# Patient Record
Sex: MALE | Race: BLACK OR AFRICAN AMERICAN | NOT HISPANIC OR LATINO | Employment: UNEMPLOYED | URBAN - METROPOLITAN AREA
[De-identification: names, ages, dates, MRNs, and addresses within clinical notes are randomized per-mention and may not be internally consistent; named-entity substitution may affect disease eponyms.]

---
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
## 2021-02-23 ENCOUNTER — IMMUNIZATION (OUTPATIENT)
Dept: PRIMARY CARE CLINIC | Facility: CLINIC | Age: 21
End: 2021-02-23
Payer: COMMERCIAL

## 2021-02-23 DIAGNOSIS — Z23 NEED FOR VACCINATION: Primary | ICD-10-CM

## 2021-02-23 PROCEDURE — 91300 COVID-19, MRNA, LNP-S, PF, 30 MCG/0.3 ML DOSE VACCINE: CPT | Mod: PBBFAC | Performed by: EMERGENCY MEDICINE

## 2021-03-16 ENCOUNTER — IMMUNIZATION (OUTPATIENT)
Dept: PRIMARY CARE CLINIC | Facility: CLINIC | Age: 21
End: 2021-03-16
Payer: COMMERCIAL

## 2021-03-16 DIAGNOSIS — Z23 NEED FOR VACCINATION: Primary | ICD-10-CM

## 2021-03-16 PROCEDURE — 0002A COVID-19, MRNA, LNP-S, PF, 30 MCG/0.3 ML DOSE VACCINE: CPT | Mod: PBBFAC | Performed by: EMERGENCY MEDICINE

## 2021-03-16 PROCEDURE — 91300 COVID-19, MRNA, LNP-S, PF, 30 MCG/0.3 ML DOSE VACCINE: CPT | Mod: PBBFAC | Performed by: EMERGENCY MEDICINE

## 2021-12-07 ENCOUNTER — OFFICE VISIT (OUTPATIENT)
Dept: FAMILY MEDICINE | Facility: CLINIC | Age: 21
End: 2021-12-07
Payer: COMMERCIAL

## 2021-12-07 VITALS
TEMPERATURE: 98 F | WEIGHT: 226.44 LBS | DIASTOLIC BLOOD PRESSURE: 88 MMHG | OXYGEN SATURATION: 97 % | BODY MASS INDEX: 36.39 KG/M2 | HEIGHT: 66 IN | SYSTOLIC BLOOD PRESSURE: 120 MMHG | HEART RATE: 100 BPM

## 2021-12-07 DIAGNOSIS — E89.0 POSTABLATIVE HYPOTHYROIDISM: ICD-10-CM

## 2021-12-07 DIAGNOSIS — E05.00 GRAVES DISEASE: ICD-10-CM

## 2021-12-07 DIAGNOSIS — B35.3 TINEA PEDIS OF BOTH FEET: ICD-10-CM

## 2021-12-07 DIAGNOSIS — I10 ESSENTIAL HYPERTENSION: ICD-10-CM

## 2021-12-07 DIAGNOSIS — Z23 NEED FOR INFLUENZA VACCINATION: ICD-10-CM

## 2021-12-07 DIAGNOSIS — Z00.00 WELLNESS EXAMINATION: ICD-10-CM

## 2021-12-07 DIAGNOSIS — Z00.00 ENCOUNTER FOR MEDICAL EXAMINATION TO ESTABLISH CARE: Primary | ICD-10-CM

## 2021-12-07 DIAGNOSIS — Z23 NEED FOR DIPHTHERIA-TETANUS-PERTUSSIS (TDAP) VACCINE: ICD-10-CM

## 2021-12-07 PROCEDURE — 90686 FLU VACCINE (QUAD) GREATER THAN OR EQUAL TO 3YO PRESERVATIVE FREE IM: ICD-10-PCS | Mod: S$GLB,,, | Performed by: FAMILY MEDICINE

## 2021-12-07 PROCEDURE — 99999 PR PBB SHADOW E&M-EST. PATIENT-LVL V: ICD-10-PCS | Mod: PBBFAC,,, | Performed by: FAMILY MEDICINE

## 2021-12-07 PROCEDURE — 99214 PR OFFICE/OUTPT VISIT, EST, LEVL IV, 30-39 MIN: ICD-10-PCS | Mod: 25,S$GLB,, | Performed by: FAMILY MEDICINE

## 2021-12-07 PROCEDURE — 90686 IIV4 VACC NO PRSV 0.5 ML IM: CPT | Mod: S$GLB,,, | Performed by: FAMILY MEDICINE

## 2021-12-07 PROCEDURE — 99214 OFFICE O/P EST MOD 30 MIN: CPT | Mod: 25,S$GLB,, | Performed by: FAMILY MEDICINE

## 2021-12-07 PROCEDURE — 99999 PR PBB SHADOW E&M-EST. PATIENT-LVL V: CPT | Mod: PBBFAC,,, | Performed by: FAMILY MEDICINE

## 2021-12-07 PROCEDURE — 90472 TDAP VACCINE GREATER THAN OR EQUAL TO 7YO IM: ICD-10-PCS | Mod: S$GLB,,, | Performed by: FAMILY MEDICINE

## 2021-12-07 PROCEDURE — 90471 FLU VACCINE (QUAD) GREATER THAN OR EQUAL TO 3YO PRESERVATIVE FREE IM: ICD-10-PCS | Mod: S$GLB,,, | Performed by: FAMILY MEDICINE

## 2021-12-07 PROCEDURE — 90471 IMMUNIZATION ADMIN: CPT | Mod: S$GLB,,, | Performed by: FAMILY MEDICINE

## 2021-12-07 PROCEDURE — 90715 TDAP VACCINE GREATER THAN OR EQUAL TO 7YO IM: ICD-10-PCS | Mod: S$GLB,,, | Performed by: FAMILY MEDICINE

## 2021-12-07 PROCEDURE — 90715 TDAP VACCINE 7 YRS/> IM: CPT | Mod: S$GLB,,, | Performed by: FAMILY MEDICINE

## 2021-12-07 PROCEDURE — 90472 IMMUNIZATION ADMIN EACH ADD: CPT | Mod: S$GLB,,, | Performed by: FAMILY MEDICINE

## 2021-12-07 RX ORDER — CETIRIZINE HYDROCHLORIDE 10 MG/1
10 TABLET ORAL
COMMUNITY

## 2021-12-07 RX ORDER — ESCITALOPRAM OXALATE 10 MG/1
10 TABLET ORAL DAILY
COMMUNITY
Start: 2021-11-08 | End: 2023-08-08

## 2021-12-07 RX ORDER — DIVALPROEX SODIUM 500 MG/1
TABLET, DELAYED RELEASE ORAL
COMMUNITY
Start: 2021-11-08

## 2021-12-07 RX ORDER — CLOZAPINE 100 MG/1
TABLET ORAL
COMMUNITY
Start: 2021-11-08

## 2021-12-07 RX ORDER — LEVOTHYROXINE SODIUM 175 UG/1
175 TABLET ORAL DAILY
COMMUNITY
Start: 2021-11-15 | End: 2022-01-07 | Stop reason: SDUPTHER

## 2021-12-07 RX ORDER — TERBINAFINE HYDROCHLORIDE 250 MG/1
250 TABLET ORAL DAILY
Qty: 90 TABLET | Refills: 0 | Status: SHIPPED | OUTPATIENT
Start: 2021-12-07 | End: 2022-02-27

## 2021-12-09 ENCOUNTER — PATIENT MESSAGE (OUTPATIENT)
Dept: FAMILY MEDICINE | Facility: CLINIC | Age: 21
End: 2021-12-09
Payer: COMMERCIAL

## 2021-12-09 DIAGNOSIS — R73.03 PRE-DIABETES: Primary | ICD-10-CM

## 2021-12-09 DIAGNOSIS — E78.5 DYSLIPIDEMIA: ICD-10-CM

## 2021-12-09 LAB
ALBUMIN SERPL-MCNC: 4.7 G/DL (ref 4.1–5.2)
ALBUMIN/GLOB SERPL: 2 {RATIO} (ref 1.2–2.2)
ALP SERPL-CCNC: 69 IU/L (ref 44–121)
ALT SERPL-CCNC: 15 IU/L (ref 0–44)
AST SERPL-CCNC: 17 IU/L (ref 0–40)
BILIRUB SERPL-MCNC: 0.4 MG/DL (ref 0–1.2)
BUN SERPL-MCNC: 16 MG/DL (ref 6–20)
BUN/CREAT SERPL: 14 (ref 9–20)
CALCIUM SERPL-MCNC: 9.8 MG/DL (ref 8.7–10.2)
CHLORIDE SERPL-SCNC: 103 MMOL/L (ref 96–106)
CHOLEST SERPL-MCNC: 145 MG/DL (ref 100–199)
CO2 SERPL-SCNC: 22 MMOL/L (ref 20–29)
CREAT SERPL-MCNC: 1.14 MG/DL (ref 0.76–1.27)
GLOBULIN SER CALC-MCNC: 2.3 G/DL (ref 1.5–4.5)
GLUCOSE SERPL-MCNC: 101 MG/DL (ref 65–99)
HBA1C MFR BLD: 5.7 % (ref 4.8–5.6)
HCV AB S/CO SERPL IA: <0.1 S/CO RATIO (ref 0–0.9)
HDLC SERPL-MCNC: 35 MG/DL
HIV 1+2 AB+HIV1 P24 AG SERPL QL IA: NON REACTIVE
LDLC SERPL CALC-MCNC: 68 MG/DL (ref 0–99)
POTASSIUM SERPL-SCNC: 4.3 MMOL/L (ref 3.5–5.2)
PROT SERPL-MCNC: 7 G/DL (ref 6–8.5)
SODIUM SERPL-SCNC: 140 MMOL/L (ref 134–144)
TRIGL SERPL-MCNC: 260 MG/DL (ref 0–149)
VLDLC SERPL CALC-MCNC: 42 MG/DL (ref 5–40)

## 2021-12-13 ENCOUNTER — OFFICE VISIT (OUTPATIENT)
Dept: PODIATRY | Facility: CLINIC | Age: 21
End: 2021-12-13
Payer: COMMERCIAL

## 2021-12-13 VITALS — HEIGHT: 66 IN | BODY MASS INDEX: 36.39 KG/M2 | WEIGHT: 226.44 LBS

## 2021-12-13 DIAGNOSIS — B35.3 TINEA PEDIS OF BOTH FEET: ICD-10-CM

## 2021-12-13 DIAGNOSIS — B35.1 ONYCHOMYCOSIS DUE TO DERMATOPHYTE: Primary | ICD-10-CM

## 2021-12-13 PROCEDURE — 99203 PR OFFICE/OUTPT VISIT, NEW, LEVL III, 30-44 MIN: ICD-10-PCS | Mod: S$GLB,,, | Performed by: PODIATRIST

## 2021-12-13 PROCEDURE — 99999 PR PBB SHADOW E&M-EST. PATIENT-LVL III: ICD-10-PCS | Mod: PBBFAC,,, | Performed by: PODIATRIST

## 2021-12-13 PROCEDURE — 99999 PR PBB SHADOW E&M-EST. PATIENT-LVL III: CPT | Mod: PBBFAC,,, | Performed by: PODIATRIST

## 2021-12-13 PROCEDURE — 99203 OFFICE O/P NEW LOW 30 MIN: CPT | Mod: S$GLB,,, | Performed by: PODIATRIST

## 2021-12-13 RX ORDER — KETOCONAZOLE 20 MG/G
CREAM TOPICAL DAILY
Qty: 60 G | Refills: 3 | Status: SHIPPED | OUTPATIENT
Start: 2021-12-13 | End: 2022-08-19

## 2022-01-05 ENCOUNTER — OFFICE VISIT (OUTPATIENT)
Dept: ENDOCRINOLOGY | Facility: CLINIC | Age: 22
End: 2022-01-05
Payer: COMMERCIAL

## 2022-01-05 VITALS
TEMPERATURE: 98 F | WEIGHT: 223.19 LBS | HEART RATE: 110 BPM | DIASTOLIC BLOOD PRESSURE: 83 MMHG | BODY MASS INDEX: 36.03 KG/M2 | SYSTOLIC BLOOD PRESSURE: 135 MMHG

## 2022-01-05 DIAGNOSIS — E05.00 GRAVES DISEASE: ICD-10-CM

## 2022-01-05 DIAGNOSIS — F25.0 SCHIZOAFFECTIVE DISORDER, BIPOLAR TYPE: ICD-10-CM

## 2022-01-05 DIAGNOSIS — E89.0 POSTABLATIVE HYPOTHYROIDISM: ICD-10-CM

## 2022-01-05 PROBLEM — F25.9 SCHIZOAFFECTIVE DISORDER: Status: ACTIVE | Noted: 2022-01-05

## 2022-01-05 PROBLEM — F25.9 SCHIZOAFFECTIVE DISORDER: Chronic | Status: ACTIVE | Noted: 2022-01-05

## 2022-01-05 PROCEDURE — 1160F PR REVIEW ALL MEDS BY PRESCRIBER/CLIN PHARMACIST DOCUMENTED: ICD-10-PCS | Mod: CPTII,S$GLB,, | Performed by: HOSPITALIST

## 2022-01-05 PROCEDURE — 99204 OFFICE O/P NEW MOD 45 MIN: CPT | Mod: S$GLB,,, | Performed by: HOSPITALIST

## 2022-01-05 PROCEDURE — 3075F PR MOST RECENT SYSTOLIC BLOOD PRESS GE 130-139MM HG: ICD-10-PCS | Mod: CPTII,S$GLB,, | Performed by: HOSPITALIST

## 2022-01-05 PROCEDURE — 99499 RISK ADDL DX/OHS AUDIT: ICD-10-PCS | Mod: S$GLB,,, | Performed by: HOSPITALIST

## 2022-01-05 PROCEDURE — 99499 UNLISTED E&M SERVICE: CPT | Mod: S$GLB,,, | Performed by: HOSPITALIST

## 2022-01-05 PROCEDURE — 99999 PR PBB SHADOW E&M-EST. PATIENT-LVL IV: ICD-10-PCS | Mod: PBBFAC,,, | Performed by: HOSPITALIST

## 2022-01-05 PROCEDURE — 3008F BODY MASS INDEX DOCD: CPT | Mod: CPTII,S$GLB,, | Performed by: HOSPITALIST

## 2022-01-05 PROCEDURE — 1159F MED LIST DOCD IN RCRD: CPT | Mod: CPTII,S$GLB,, | Performed by: HOSPITALIST

## 2022-01-05 PROCEDURE — 3008F PR BODY MASS INDEX (BMI) DOCUMENTED: ICD-10-PCS | Mod: CPTII,S$GLB,, | Performed by: HOSPITALIST

## 2022-01-05 PROCEDURE — 3075F SYST BP GE 130 - 139MM HG: CPT | Mod: CPTII,S$GLB,, | Performed by: HOSPITALIST

## 2022-01-05 PROCEDURE — 99204 PR OFFICE/OUTPT VISIT, NEW, LEVL IV, 45-59 MIN: ICD-10-PCS | Mod: S$GLB,,, | Performed by: HOSPITALIST

## 2022-01-05 PROCEDURE — 3079F PR MOST RECENT DIASTOLIC BLOOD PRESSURE 80-89 MM HG: ICD-10-PCS | Mod: CPTII,S$GLB,, | Performed by: HOSPITALIST

## 2022-01-05 PROCEDURE — 1159F PR MEDICATION LIST DOCUMENTED IN MEDICAL RECORD: ICD-10-PCS | Mod: CPTII,S$GLB,, | Performed by: HOSPITALIST

## 2022-01-05 PROCEDURE — 1160F RVW MEDS BY RX/DR IN RCRD: CPT | Mod: CPTII,S$GLB,, | Performed by: HOSPITALIST

## 2022-01-05 PROCEDURE — 99999 PR PBB SHADOW E&M-EST. PATIENT-LVL IV: CPT | Mod: PBBFAC,,, | Performed by: HOSPITALIST

## 2022-01-05 PROCEDURE — 3079F DIAST BP 80-89 MM HG: CPT | Mod: CPTII,S$GLB,, | Performed by: HOSPITALIST

## 2022-01-05 NOTE — PROGRESS NOTES
Subjective:      Patient ID: Francisca Rm is a 21 y.o. male presented to [facility] on 1/5/2022.  Chief Complaint:  Hypothyroidism      History of Present Illness: Francisca Rm is a 21 y.o. male here for  postablative hypothyroidism due to Graves disease  Other significant past medical history:  Schizoaffective disorder, obesity  Mother provided main history      Diagnosed with  Postablative hypothyroidism:   2017 had Graves disease>> Had VASQUEZ ablation in 2017  Treated at [facility] here.   Had psych issue at that time which masked his hyperthyroidism symptoms    Current medication:  Generic levothyroxine 175 mcg daily>> mom gives him his medication> does not miss doses>> forget give it on Satuday and Sunday at least 1-2x a week>> she will give it to him in the afternoon  Take pills without any issue  Take 30 min before any other medications       Family history thyroid disorder: maternal aunt  Thyroid goiter: no  Previous US/FNA: no    US thyroid 9/2017  THYROID ULTRASOUND: The thyroid gland appears mildly enlarged with right thyroid lobe. measured at 5.4 x 1.3 x 2.2 cm with thyroid volume estimated at 7.3 mL's. The left thyroid lobe is 4.9 x 1.1 x 1.8 cm with left thyroid volume estimated at 4.7 mL's. There is mild diffuse heterogeneity without obvious nodule or cyst. There is no significant increased blood flow or additional abnormality. There is no significant adjacent adenopathy.     IMPRESSION: MILD DIFFUSE HETEROGENEITY AND ENLARGEMENT WITHOUT FOCAL DISEASE    Due to cognition, he does not report symptoms  Mom with noticed that patient would get more agitated or increase in diaphoresis on occasion>> if there are thyroid issue  No increases in weight recently.  No changes in mental status or behavior    Reviewed past surgical, medical, family, social history and updated as appropriate.    Review of Systems: see HPI above    Objective:   /83   Pulse 110   Temp 98  °F (36.7 °C) (Oral)   Wt 101.2 kg (223 lb 3.5 oz)   BMI 36.03 kg/m²     Body mass index is 36.03 kg/m².  Vital signs reviewed    Physical Exam  Vitals and nursing note reviewed.   Constitutional:       General: He is not in acute distress.     Appearance: Normal appearance. He is well-developed. He is obese. He is not toxic-appearing.      Comments: Flat affect, minimally interactive with physician   Neck:      Thyroid: No thyromegaly.      Comments: No goiter noted  Cardiovascular:      Heart sounds: Normal heart sounds.   Pulmonary:      Effort: Pulmonary effort is normal. No respiratory distress.   Abdominal:      Tenderness: There is no abdominal tenderness.   Musculoskeletal:         General: No deformity. Normal range of motion.      Cervical back: Normal range of motion.   Skin:     Findings: No bruising.   Neurological:      Mental Status: He is alert and oriented to person, place, and time.   Psychiatric:         Mood and Affect: Mood normal.         Lab Reviewed:   Lab Results   Component Value Date    HGBA1C 5.7 (H) 12/08/2021       Lab Results   Component Value Date    CHOL 145 12/08/2021    HDL 35 (L) 12/08/2021    LDLCALC 68 12/08/2021    TRIG 260 (H) 12/08/2021    CHOLHDL 3.02 03/06/2020       Lab Results   Component Value Date     12/08/2021    K 4.3 12/08/2021     12/08/2021    CO2 22 12/08/2021     (H) 12/08/2021    BUN 16 12/08/2021    CREATININE 1.14 12/08/2021    CALCIUM 9.8 12/08/2021    ALBUMIN 4.7 12/08/2021    BILITOT 0.4 12/08/2021    AST 17 12/08/2021    ALT 15 12/08/2021    EGFRNONAA 91 12/08/2021        Lab Results   Component Value Date    CALCIUM 9.8 12/08/2021       Assessment     1. Postablative hypothyroidism  Ambulatory referral/consult to Endocrinology    TSH    T4, Free    TSH    T4, Free   2. Graves disease  Ambulatory referral/consult to Endocrinology   3. Schizoaffective disorder, bipolar type          Plan     Postablative hypothyroidism  - Pt has a known  h/o hypothyroidism diagnosed due to  post-surgical in setting of Graves disease as reported  - patient's medications being managed by his mother due to longstanding psychiatric illness  - overall off and on compliance due to missed doses on the weekend  - Last TSH:  Chart review showed elevation in TSH noted, in (Care Everywhere section)  - Clinically unable to evaluate symptoms of hypothyroidism due to psychiatric illness  - Weight based dose is dosage (1.6 mcg/kg = 161  mcg)    Plan  - long discussion with mother about proper administration and direction on levothyroxine, she is aware  Advised to not skip doses if possible  - continue for now levothyroxine 175 mcg q.a.m.  - we will repeat TFTs tomorrow to re-evaluate thyroid function before making further adjustment   - close follow-up in 3 months, pending results above    Schizoaffective disorder  On multiple medication at this time, mood is stable, some can affect thyroid function      RTC in 3 months, virtual per patient's mother request      Pranav Barr M.D.  Endocrinology  [facility]  1/5/2022      Disclaimer: This note has been generated in part with the use of voice-recognition software. There may be typographical errors that have been missed during proof-reading.

## 2022-01-05 NOTE — ASSESSMENT & PLAN NOTE
- Pt has a known h/o hypothyroidism diagnosed due to  post-surgical in setting of Graves disease as reported  - patient's medications being managed by his mother due to longstanding psychiatric illness  - overall off and on compliance due to missed doses on the weekend  - Last TSH:  Chart review showed elevation in TSH noted, in (Care Everywhere section)  - Clinically unable to evaluate symptoms of hypothyroidism due to psychiatric illness  - Weight based dose is dosage (1.6 mcg/kg = 161  mcg)    Plan  - long discussion with mother about proper administration and direction on levothyroxine, she is aware  Advised to not skip doses if possible  - continue for now levothyroxine 175 mcg q.a.m.  - we will repeat TFTs tomorrow to re-evaluate thyroid function before making further adjustment   - close follow-up in 3 months, pending results above

## 2022-01-07 ENCOUNTER — TELEPHONE (OUTPATIENT)
Dept: ENDOCRINOLOGY | Facility: CLINIC | Age: 22
End: 2022-01-07
Payer: COMMERCIAL

## 2022-01-07 DIAGNOSIS — E89.0 POSTABLATIVE HYPOTHYROIDISM: Primary | ICD-10-CM

## 2022-01-07 RX ORDER — LEVOTHYROXINE SODIUM 175 UG/1
175 TABLET ORAL DAILY
Qty: 90 TABLET | Refills: 2 | Status: SHIPPED | OUTPATIENT
Start: 2022-01-07 | End: 2022-04-05 | Stop reason: SDUPTHER

## 2022-01-07 NOTE — TELEPHONE ENCOUNTER
[facility] lab work review:  dated on 1/6/2022  TSH:  1.710  Free T4:  1.300    Attempted to call patient's mother on 1/7/2022 5:32 PM to review recent result.   Unable to reach patient. VM was left     Pranav Barr MD  Endocrinology  1/7/2022

## 2022-01-07 NOTE — LETTER
January 7, 2022    Francisca Rm  [facility] [facility] [place] [address]         [facility] - Endocrinology  [address], [address]  [facility] [address]  Phone: [phone]  Fax: [phone] January 7, 2022     Patient: Francisca Rm   YOB: 2000   Date of Visit: 1/7/2022       Hello,    I just reviewed the [facility] lab work dated on 1/6/2022  TSH:  1.710  Free T4:  1.300    It showed normal thyroid function.  We will send of refill for his levothyroxine to the pharmacy    He should follow up with me in 3 months with repeat lab work again to monitor  Here are the result as well as his lab work order for 3 months from now    Please keep up the good work.              Thanks   Pranav Barr MD

## 2022-02-26 DIAGNOSIS — B35.3 TINEA PEDIS OF BOTH FEET: ICD-10-CM

## 2022-02-27 RX ORDER — TERBINAFINE HYDROCHLORIDE 250 MG/1
250 TABLET ORAL DAILY
Qty: 90 TABLET | Refills: 0 | Status: SHIPPED | OUTPATIENT
Start: 2022-02-27 | End: 2022-08-19

## 2022-03-19 ENCOUNTER — PATIENT MESSAGE (OUTPATIENT)
Dept: ENDOCRINOLOGY | Facility: CLINIC | Age: 22
End: 2022-03-19
Payer: COMMERCIAL

## 2022-03-19 ENCOUNTER — PATIENT MESSAGE (OUTPATIENT)
Dept: FAMILY MEDICINE | Facility: CLINIC | Age: 22
End: 2022-03-19
Payer: COMMERCIAL

## 2022-03-19 DIAGNOSIS — E89.0 POSTABLATIVE HYPOTHYROIDISM: Primary | ICD-10-CM

## 2022-03-21 ENCOUNTER — PATIENT MESSAGE (OUTPATIENT)
Dept: FAMILY MEDICINE | Facility: CLINIC | Age: 22
End: 2022-03-21
Payer: COMMERCIAL

## 2022-03-26 LAB
ALBUMIN SERPL-MCNC: 4.4 G/DL (ref 4.1–5.2)
ALBUMIN/GLOB SERPL: 1.9 {RATIO} (ref 1.2–2.2)
ALP SERPL-CCNC: 74 IU/L (ref 44–121)
ALT SERPL-CCNC: 22 IU/L (ref 0–44)
AST SERPL-CCNC: 22 IU/L (ref 0–40)
BILIRUB SERPL-MCNC: 0.2 MG/DL (ref 0–1.2)
BUN SERPL-MCNC: 15 MG/DL (ref 6–20)
BUN/CREAT SERPL: 15 (ref 9–20)
CALCIUM SERPL-MCNC: 9.5 MG/DL (ref 8.7–10.2)
CHLORIDE SERPL-SCNC: 105 MMOL/L (ref 96–106)
CHOLEST SERPL-MCNC: 137 MG/DL (ref 100–199)
CO2 SERPL-SCNC: 22 MMOL/L (ref 20–29)
CREAT SERPL-MCNC: 0.99 MG/DL (ref 0.76–1.27)
EST. GFR  (NO RACE VARIABLE): 111 ML/MIN/1.73
GLOBULIN SER CALC-MCNC: 2.3 G/DL (ref 1.5–4.5)
GLUCOSE SERPL-MCNC: 99 MG/DL (ref 65–99)
HBA1C MFR BLD: 5.8 % (ref 4.8–5.6)
HDLC SERPL-MCNC: 32 MG/DL
LDLC SERPL CALC-MCNC: 68 MG/DL (ref 0–99)
POTASSIUM SERPL-SCNC: 4.4 MMOL/L (ref 3.5–5.2)
PROT SERPL-MCNC: 6.7 G/DL (ref 6–8.5)
SODIUM SERPL-SCNC: 140 MMOL/L (ref 134–144)
TRIGL SERPL-MCNC: 225 MG/DL (ref 0–149)
VLDLC SERPL CALC-MCNC: 37 MG/DL (ref 5–40)

## 2022-03-30 ENCOUNTER — PATIENT MESSAGE (OUTPATIENT)
Dept: FAMILY MEDICINE | Facility: CLINIC | Age: 22
End: 2022-03-30
Payer: COMMERCIAL

## 2022-03-31 ENCOUNTER — PATIENT MESSAGE (OUTPATIENT)
Dept: FAMILY MEDICINE | Facility: CLINIC | Age: 22
End: 2022-03-31
Payer: COMMERCIAL

## 2022-03-31 DIAGNOSIS — R73.03 PRE-DIABETES: Primary | ICD-10-CM

## 2022-04-01 ENCOUNTER — PATIENT MESSAGE (OUTPATIENT)
Dept: FAMILY MEDICINE | Facility: CLINIC | Age: 22
End: 2022-04-01
Payer: COMMERCIAL

## 2022-04-01 ENCOUNTER — TELEPHONE (OUTPATIENT)
Dept: FAMILY MEDICINE | Facility: CLINIC | Age: 22
End: 2022-04-01
Payer: COMMERCIAL

## 2022-04-01 DIAGNOSIS — R73.03 PRE-DIABETES: ICD-10-CM

## 2022-04-01 DIAGNOSIS — E78.1 HYPERTRIGLYCERIDEMIA: Primary | ICD-10-CM

## 2022-04-01 RX ORDER — PRAVASTATIN SODIUM 20 MG/1
20 TABLET ORAL DAILY
Qty: 90 TABLET | Refills: 3 | Status: SHIPPED | OUTPATIENT
Start: 2022-04-01 | End: 2022-12-22

## 2022-04-01 RX ORDER — METFORMIN HYDROCHLORIDE 500 MG/1
500 TABLET ORAL
Qty: 90 TABLET | Refills: 3 | Status: SHIPPED | OUTPATIENT
Start: 2022-04-01 | End: 2022-12-22

## 2022-04-05 ENCOUNTER — OFFICE VISIT (OUTPATIENT)
Dept: ENDOCRINOLOGY | Facility: CLINIC | Age: 22
End: 2022-04-05
Payer: COMMERCIAL

## 2022-04-05 ENCOUNTER — PATIENT MESSAGE (OUTPATIENT)
Dept: ENDOCRINOLOGY | Facility: CLINIC | Age: 22
End: 2022-04-05

## 2022-04-05 DIAGNOSIS — E66.9 CLASS 2 OBESITY WITHOUT SERIOUS COMORBIDITY WITH BODY MASS INDEX (BMI) OF 36.0 TO 36.9 IN ADULT, UNSPECIFIED OBESITY TYPE: ICD-10-CM

## 2022-04-05 DIAGNOSIS — F25.0 SCHIZOAFFECTIVE DISORDER, BIPOLAR TYPE: Chronic | ICD-10-CM

## 2022-04-05 DIAGNOSIS — E89.0 POSTABLATIVE HYPOTHYROIDISM: ICD-10-CM

## 2022-04-05 PROCEDURE — 99214 OFFICE O/P EST MOD 30 MIN: CPT | Mod: 95,,, | Performed by: HOSPITALIST

## 2022-04-05 PROCEDURE — 99499 UNLISTED E&M SERVICE: CPT | Mod: 95,,, | Performed by: HOSPITALIST

## 2022-04-05 PROCEDURE — 1159F PR MEDICATION LIST DOCUMENTED IN MEDICAL RECORD: ICD-10-PCS | Mod: CPTII,95,, | Performed by: HOSPITALIST

## 2022-04-05 PROCEDURE — 1160F RVW MEDS BY RX/DR IN RCRD: CPT | Mod: CPTII,95,, | Performed by: HOSPITALIST

## 2022-04-05 PROCEDURE — 99214 PR OFFICE/OUTPT VISIT, EST, LEVL IV, 30-39 MIN: ICD-10-PCS | Mod: 95,,, | Performed by: HOSPITALIST

## 2022-04-05 PROCEDURE — 99499 RISK ADDL DX/OHS AUDIT: ICD-10-PCS | Mod: 95,,, | Performed by: HOSPITALIST

## 2022-04-05 PROCEDURE — 1160F PR REVIEW ALL MEDS BY PRESCRIBER/CLIN PHARMACIST DOCUMENTED: ICD-10-PCS | Mod: CPTII,95,, | Performed by: HOSPITALIST

## 2022-04-05 PROCEDURE — 1159F MED LIST DOCD IN RCRD: CPT | Mod: CPTII,95,, | Performed by: HOSPITALIST

## 2022-04-05 RX ORDER — LEVOTHYROXINE SODIUM 175 UG/1
175 TABLET ORAL DAILY
Qty: 90 TABLET | Refills: 3 | Status: SHIPPED | OUTPATIENT
Start: 2022-04-05 | End: 2023-02-28

## 2022-04-05 NOTE — ASSESSMENT & PLAN NOTE
- Pt has a known h/o hypothyroidism diagnosed due to  post-surgical in setting of Graves disease as reported  - patient's medications being managed by his mother due to longstanding psychiatric illness  - better compliance reported  - Last TSH:  Much better noted on 01/2022  - Clinically unable to evaluate symptoms of hypothyroidism due to psychiatric illness  - long discussion with mother about proper administration and direction on levothyroxine, she is aware, Advised to not skip doses if possible  - continue for now levothyroxine 175 mcg q.a.m.  - we will repeat TFTs soon, to make further adjustment if indicated prescription for lab work to be sent via message to mother  - close follow-up in 5 months, pending results above

## 2022-04-05 NOTE — LETTER
April 7, 2022    Francisca Rm  [facility] [facility] [place] [address]         [facility] - Endocrinology  [address], [address]  [facility] [address]  Office:  [phone]  Fax:  [phone]   April 7, 2022     Patient: Francisca Rm   YOB: 2000   Date of Visit: 4/5/2022       Hello, I just review see the [facility] blood work results, A1c 5.8%, stable.  Thyroid function:  TSH 0.29, free T4 1.49  The thyroid function is stable.  It is not overactive.      We will continue to monitor with lab work every 6 months and follow up.            Pranav Barr MD  Endocrinology  4/7/2022

## 2022-04-05 NOTE — PROGRESS NOTES
The patient location is:  [place], [place]  The chief complaint leading to consultation is:  Hypothyroidism  Visit type: Virtual visit with synchronous audio and video  Total time spent with patient:  30 minutes    Each patient to whom he or she provides medical services by telemedicine is:  (1) informed of the relationship between the physician and patient and the respective role of any other health care provider with respect to management of the patient; and (2) notified that he or she may decline to receive medical services by telemedicine and may withdraw from such care at any time.    Subjective:      Patient ID: Francisca Rm is a 21 y.o. male presented to [facility] on 4/5/2022.  Chief Complaint:  Hypothyroidism (Postablative)      History of Present Illness: Francisca Rm is a 21 y.o. male here for  postablative hypothyroidism due to Graves disease  Other significant past medical history:  Schizoaffective disorder, obesity  Mother provided main history      Interval history:  Virtual visit.  Patient and mother present for virtual visit to discuss hypothyroidism.  Medication compliant at this time.  Patient is more interactive, reports that he is doing well.  Due to scheduling error unable to get lab work done.  We will fax over repeat lab work request.      Diagnosed with  Postablative hypothyroidism:   2017 had Graves disease>> Had VASQUEZ ablation in 2017  Treated at [facility] here.   Had psych issue at that time which masked his hyperthyroidism symptoms    Current medication:  Generic levothyroxine 175 mcg daily>> mom gives him his medication> does not miss doses>> forget give it on Satuday and Sunday at least 1-2x a week>> she will give it to him in the afternoon  Take pills without any issue  Take 30 min before any other medications       Family history thyroid disorder: maternal aunt  Thyroid goiter: no  Previous US/FNA: no    US thyroid 9/2017  THYROID ULTRASOUND:  The thyroid gland appears mildly enlarged with right thyroid lobe. measured at 5.4 x 1.3 x 2.2 cm with thyroid volume estimated at 7.3 mL's. The left thyroid lobe is 4.9 x 1.1 x 1.8 cm with left thyroid volume estimated at 4.7 mL's. There is mild diffuse heterogeneity without obvious nodule or cyst. There is no significant increased blood flow or additional abnormality. There is no significant adjacent adenopathy.     IMPRESSION: MILD DIFFUSE HETEROGENEITY AND ENLARGEMENT WITHOUT FOCAL DISEASE    Due to cognition, he does not report symptoms  Mom with noticed that patient would get more agitated or increase in diaphoresis on occasion>> if there are thyroid issue  No increases in weight recently.  No changes in mental status or behavior    Reviewed past surgical, medical, family, social history and updated as appropriate.    Review of Systems   : see HPI above    Objective:   There were no vitals taken for this visit.    There is no height or weight on file to calculate BMI.  Vital signs reviewed    Physical Exam  Constitutional:       Comments: Physical exam and Vital signs were not done, as this is a virtual visit.         Lab Reviewed:   Lab Results   Component Value Date    HGBA1C 5.8 (H) 03/25/2022       Lab Results   Component Value Date    CHOL 137 03/25/2022    HDL 32 (L) 03/25/2022    LDLCALC 68 03/25/2022    TRIG 225 (H) 03/25/2022    CHOLHDL 3.02 03/06/2020       Lab Results   Component Value Date     03/25/2022    K 4.4 03/25/2022     03/25/2022    CO2 22 03/25/2022    GLU 99 03/25/2022    BUN 15 03/25/2022    CREATININE 0.99 03/25/2022    CALCIUM 9.5 03/25/2022    ALBUMIN 4.4 03/25/2022    BILITOT 0.2 03/25/2022    AST 22 03/25/2022    ALT 22 03/25/2022    EGFRNONAA 91 12/08/2021        Lab Results   Component Value Date    CALCIUM 9.5 03/25/2022    CALCIUM 9.8 12/08/2021       Assessment     1. Postablative hypothyroidism  levothyroxine (SYNTHROID, LEVOTHROID) 175 MCG tablet    TSH    T4,  Free    TSH    T4, Free   2. Schizoaffective disorder, bipolar type     3. Class 2 obesity without serious comorbidity with body mass index (BMI) of 36.0 to 36.9 in adult, unspecified obesity type          Plan     Postablative hypothyroidism  - Pt has a known h/o hypothyroidism diagnosed due to  post-surgical in setting of Graves disease as reported  - patient's medications being managed by his mother due to longstanding psychiatric illness  - better compliance reported  - Last TSH:  Much better noted on 01/2022  - Clinically unable to evaluate symptoms of hypothyroidism due to psychiatric illness  - long discussion with mother about proper administration and direction on levothyroxine, she is aware, Advised to not skip doses if possible  - continue for now levothyroxine 175 mcg q.a.m.  - we will repeat TFTs soon, to make further adjustment if indicated prescription for lab work to be sent via message to mother  - close follow-up in 5 months, pending results above    Schizoaffective disorder  - On multiple medication at this time, mood is stable, some can affect thyroid function    Obesity  - BMI 36, obesity with insulin resistant noted  - with prediabetes, managed by PCP      RTC in 5 months, in clinic    Pranav Barr M.D.  Endocrinology  [facility]  4/5/2022      Disclaimer: This note has been generated in part with the use of voice-recognition software. There may be typographical errors that have been missed during proof-reading.    ----------------------------------    Addendum 4/7/2022 5:46 PM  With  fax results    A1c 5.8%  TSH 0.229, free T4:  1.49        Stable will continue    Pranav Barr MD  Endocrinology  4/7/2022

## 2022-04-06 ENCOUNTER — TELEPHONE (OUTPATIENT)
Dept: ENDOCRINOLOGY | Facility: CLINIC | Age: 22
End: 2022-04-06
Payer: COMMERCIAL

## 2022-04-06 NOTE — TELEPHONE ENCOUNTER
----- Message from Pranav Barr MD sent at 4/5/2022  5:14 PM CDT -----  5 minutes recall with lab work prior

## 2022-04-18 ENCOUNTER — PATIENT MESSAGE (OUTPATIENT)
Dept: ENDOCRINOLOGY | Facility: CLINIC | Age: 22
End: 2022-04-18
Payer: COMMERCIAL

## 2022-04-24 ENCOUNTER — PATIENT MESSAGE (OUTPATIENT)
Dept: FAMILY MEDICINE | Facility: CLINIC | Age: 22
End: 2022-04-24
Payer: COMMERCIAL

## 2022-05-07 ENCOUNTER — OFFICE VISIT (OUTPATIENT)
Dept: URGENT CARE | Facility: CLINIC | Age: 22
End: 2022-05-07
Payer: COMMERCIAL

## 2022-05-07 VITALS
DIASTOLIC BLOOD PRESSURE: 74 MMHG | OXYGEN SATURATION: 97 % | HEIGHT: 66 IN | BODY MASS INDEX: 38.57 KG/M2 | SYSTOLIC BLOOD PRESSURE: 119 MMHG | HEART RATE: 119 BPM | WEIGHT: 240 LBS | RESPIRATION RATE: 16 BRPM | TEMPERATURE: 98 F

## 2022-05-07 DIAGNOSIS — L02.91 ABSCESS: Primary | ICD-10-CM

## 2022-05-07 PROCEDURE — 3074F PR MOST RECENT SYSTOLIC BLOOD PRESSURE < 130 MM HG: ICD-10-PCS | Mod: CPTII,S$GLB,, | Performed by: FAMILY MEDICINE

## 2022-05-07 PROCEDURE — 99203 OFFICE O/P NEW LOW 30 MIN: CPT | Mod: S$GLB,,, | Performed by: FAMILY MEDICINE

## 2022-05-07 PROCEDURE — 1160F PR REVIEW ALL MEDS BY PRESCRIBER/CLIN PHARMACIST DOCUMENTED: ICD-10-PCS | Mod: CPTII,S$GLB,, | Performed by: FAMILY MEDICINE

## 2022-05-07 PROCEDURE — 1159F MED LIST DOCD IN RCRD: CPT | Mod: CPTII,S$GLB,, | Performed by: FAMILY MEDICINE

## 2022-05-07 PROCEDURE — 3008F BODY MASS INDEX DOCD: CPT | Mod: CPTII,S$GLB,, | Performed by: FAMILY MEDICINE

## 2022-05-07 PROCEDURE — 99203 PR OFFICE/OUTPT VISIT, NEW, LEVL III, 30-44 MIN: ICD-10-PCS | Mod: S$GLB,,, | Performed by: FAMILY MEDICINE

## 2022-05-07 PROCEDURE — 3078F PR MOST RECENT DIASTOLIC BLOOD PRESSURE < 80 MM HG: ICD-10-PCS | Mod: CPTII,S$GLB,, | Performed by: FAMILY MEDICINE

## 2022-05-07 PROCEDURE — 3074F SYST BP LT 130 MM HG: CPT | Mod: CPTII,S$GLB,, | Performed by: FAMILY MEDICINE

## 2022-05-07 PROCEDURE — 1159F PR MEDICATION LIST DOCUMENTED IN MEDICAL RECORD: ICD-10-PCS | Mod: CPTII,S$GLB,, | Performed by: FAMILY MEDICINE

## 2022-05-07 PROCEDURE — 3008F PR BODY MASS INDEX (BMI) DOCUMENTED: ICD-10-PCS | Mod: CPTII,S$GLB,, | Performed by: FAMILY MEDICINE

## 2022-05-07 PROCEDURE — 3078F DIAST BP <80 MM HG: CPT | Mod: CPTII,S$GLB,, | Performed by: FAMILY MEDICINE

## 2022-05-07 PROCEDURE — 1160F RVW MEDS BY RX/DR IN RCRD: CPT | Mod: CPTII,S$GLB,, | Performed by: FAMILY MEDICINE

## 2022-05-07 RX ORDER — ACETAMINOPHEN 500 MG
1000 TABLET ORAL
Status: COMPLETED | OUTPATIENT
Start: 2022-05-07 | End: 2022-05-07

## 2022-05-07 RX ORDER — METRONIDAZOLE 500 MG/1
500 TABLET ORAL EVERY 12 HOURS
Qty: 14 TABLET | Refills: 0 | Status: SHIPPED | OUTPATIENT
Start: 2022-05-07 | End: 2022-05-14

## 2022-05-07 RX ORDER — CEPHALEXIN 500 MG/1
500 CAPSULE ORAL EVERY 6 HOURS
Qty: 28 CAPSULE | Refills: 0 | Status: SHIPPED | OUTPATIENT
Start: 2022-05-07 | End: 2022-05-14

## 2022-05-07 RX ADMIN — Medication 1000 MG: at 10:05

## 2022-05-07 NOTE — PROGRESS NOTES
"Subjective:       Patient ID: Francisca Rm is a 21 y.o. male.    Vitals:  height is 5' 6" (1.676 m) and weight is 108.9 kg (240 lb). His tympanic temperature is 97.7 °F (36.5 °C). His blood pressure is 119/74 and his pulse is 119 (abnormal). His respiration is 16 and oxygen saturation is 97%.     Chief Complaint: Cyst (Buttocks)    Pt's mother said that she thinks he may have a pilonidal cyst on his buttocks. The pain started about 1 -1.5 weeks ago.  Provider note begins below:  Past Medical History:  No date: Autism spectrum disorder  No date: Bipolar disorder  No date: Hyperlipidemia  No date: Postablative hypothyroidism  No date: Schizophrenia   With his mom today, she reports that he has been complaining of buttock pain for the past week and half.  He was able to sit today.  She was not able to see anything initially, was using zinc ointment, and having him use heat compresses and warm soaks.  She noticed yesterday a bump that she is concerned is a pilonidal cyst.  She denies any history of recurrent skin infections or abscesses in the past.  Denies any fever or chills.    Cyst  This is a new problem. Episode onset: 1.5 weeks. The problem occurs constantly. The problem has been gradually worsening. Pertinent negatives include no chills or fever. Exacerbated by: sitting. Treatments tried: zinc oxide. The treatment provided no relief.       Constitution: Negative for chills and fever.   Musculoskeletal: Positive for pain.   Skin: Positive for abscess.       Objective:      Physical Exam   Constitutional: He is oriented to person, place, and time. He appears well-developed.  Non-toxic appearance. He does not appear ill. No distress.      Comments:Flat affect, but responds to questions appropriately, mom present for entire exam.    HENT:   Head: Normocephalic and atraumatic.   Ears:   Right Ear: External ear normal.   Left Ear: External ear normal.   Nose: Nose normal.   Mouth/Throat: Oropharynx is clear and " "moist.   Eyes: Conjunctivae, EOM and lids are normal. Pupils are equal, round, and reactive to light.   Neck: Trachea normal and phonation normal. Neck supple.   Cardiovascular: Tachycardia present.      Comments: Previous records reviewed with tachycardia at baseline.  Discussed this with mom she reports he was on atenolol the past but was taken off by PCP.  Advised to continue to monitor.   Musculoskeletal: Normal range of motion.         General: Normal range of motion.   Neurological: He is alert and oriented to person, place, and time.   Skin: Skin is warm, dry, intact, not diaphoretic and abscessed.        Psychiatric: His speech is normal and behavior is normal. Judgment and thought content normal.   Nursing note and vitals reviewed.        Assessment:       1. Abscess          Plan:       Consider abscess v pilonidal   Per utd:  "The most common organisms isolated in chronic pilonidal disease are aerobes, whereas anaerobes such as bacteroides predominate in abscesses. A reasonable antibiotic choice would be a first-generation cephalosporin (such as cefazolin) plus metronidazole."     Mom would like referral to Dermatology, have placed referral to General surgery as well for further evaluation.  Advised to continue warm soaks and moist compresses.  Tylenol given in clinic for pain.    Discussed results/diagnosis/plan with mom in clinic. Strict precautions given to mom to monitor for worsening signs and symptoms. Advised to follow up with PCP or specialist.    Explained side effects of medications prescribed with patient and informed him/her to discontinue use if he/she has any side effects and to inform UC or PCP if this occurs. All questions answered. Strict ED verses clinic return precautions stressed and given in depth. Advised if symptoms worsens of fail to improve he/she should go to the Emergency Room. Discharge and follow-up instructions given verbally/printed with the patient who expressed " understanding and willingness to comply with my recommendations. Patient voiced understanding and in agreement with current treatment plan. Patient exits the exam room in no acute distress. Conversant and engaged during discharge discussion, verbalized understanding.      Abscess  -     acetaminophen tablet 1,000 mg  -     cephALEXin (KEFLEX) 500 MG capsule; Take 1 capsule (500 mg total) by mouth every 6 (six) hours. for 7 days  Dispense: 28 capsule; Refill: 0  -     metroNIDAZOLE (FLAGYL) 500 MG tablet; Take 1 tablet (500 mg total) by mouth every 12 (twelve) hours. for 7 days  Dispense: 14 tablet; Refill: 0  -     Ambulatory referral/consult to Dermatology  -     Ambulatory referral/consult to General Surgery           Medical Decision Making:   History:   Old Medical Records: I decided to obtain old medical records.  Old Records Summarized: other records and records from clinic visits.       Patient Instructions   General Discharge Instructions   PLEASE READ YOUR DISCHARGE INSTRUCTIONS ENTIRELY AS IT CONTAINS IMPORTANT INFORMATION.  If you were prescribed a narcotic or controlled medication, do not drive or operate heavy equipment or machinery while taking these medications.  If you were prescribed antibiotics, please take them to completion.  You must understand that you've received an Urgent Care treatment only and that you may be released before all your medical problems are known or treated. You, the patient, will arrange for follow up care as instructed.    OVER THE COUNTER RECOMMENDATIONS/SUGGESTIONS.    Make sure to stay well hydrated.    Use Nasal Saline to mechanically move any post nasal drip from your eustachian tube or from the back of your throat.    Use warm salt water gargles to ease your throat pain. Warm salt water gargles as needed for sore throat- 1/2 tsp salt to 1 cup warm water, gargle as desired.    Use an antihistamine such as Claritin, Zyrtec or Allegra to dry you out.    Use  pseudoephedrine (behind the counter) to decongest. Pseudoephedrine 30 mg up to 240 mg /day. It can raise your blood pressure and give you palpitations.    Use mucinex (guaifenesin) to break up mucous up to 2400mg/day to loosen any mucous.    The mucinex DM pill has a cough suppressant that can be sedating. It can be used at night to stop the tickle at the back of your throat.    You can use Mucinex D (it has guaifenesin and a high dose of pseudoephedrine) in the mornings to help decongest.    Use Afrin in each nare for no longer than 3 days, as it is addictive. It can also dry out your mucous membranes and cause elevated blood pressure. This is especially useful if you are flying.    Use Flonase 1-2 sprays/nostril per day. It is a local acting steroid nasal spray, if you develop a bloody nose, stop using the medication immediately.    Sometimes Nyquil at night is beneficial to help you get some rest, however it is sedating and it does have an antihistamine, and tylenol.    Honey is a natural cough suppressant that can be used.    Tylenol up to 4,000 mg a day is safe for short periods and can be used for body aches, pain, and fever. However in high doses and prolonged use it can cause liver irritation.    Ibuprofen is a non-steroidal anti-inflammatory that can be used for body aches, pain, and fever.However it can also cause stomach irritation if over used.     Follow up with your PCP or specialty clinic as instructed in the next 2-3 days if not improved or as needed. You can call [phone] to schedule an appointment with appropriate provider.      If you condition worsens, we recommend that you receive another evaluation at the emergency room immediately or contact your primary medical clinic's after hours call service to discuss your concerns.      Please return here or go to the Emergency Department for any concerns or worsening condition.   You can also call [phone] to schedule an appointment with the  appropriate provider.    Please return here or go to the Emergency Department for any concerns or worsening of condition.    Thank you for choosing [facility]!    Our goal in the Urgent Care is to always provide outstanding medical care. You may receive a survey by mail or e-mail in the next week regarding your experience today. We would greatly appreciate you completing and returning the survey. Your feedback provides us with a way to recognize our staff who provide very good care, and it helps us learn how to improve when your experience was below our aspiration of excellence.      We appreciate you trusting us with your medical care. We hope you feel better soon. We will be happy to take care of you for all of your future medical needs.    Sincerely,    Susanne Huizar, STACI                                             Abscess/Cellulitis   If your condition worsens or fails to improve we recommend that you receive another evaluation at the ER immediately or contact your PCP to discuss your concerns or return here. You must understand that you've received an urgent care treatment only and that you may be released before all your medical problems are known or treated. You the patient will arrange for follouwp care as instructed.     Soak affected in warm water with epsom salts several times a day. Dilute 2 cups per gallon of water. If you cannot soak  apply warm compresses to the affected area for 20 min, 3-5 times per day and apply warm compresses frequently. If you cannot soak, use the shower head.  Use warm compresses for 20 minutes 3-5 times a day. Avoid picking or manipulating the wound. Clean the wound twice a day and put the antibiotic ointment on it. You should seek ER treatment if fever, increase pain, swelling or other signs of increasing infection.   If you were prescribed antibiotics, please take them to completion  If you are female and on BCP use additional methods to prevent pregnancy while on  antibiotics and for one cycle after.   If you were given narcotics do not drive or operate heavy equipment or machinery while taking these medications.   Tylenol or ibuprofen can also be used as directed for pain unless you have an allergy to them or medical condition such as stomach ulcers, kidney or liver disease or blood thinners etc for which you should not be taking these type of medications.   Return in 48- 72 hours for recheck.

## 2022-05-07 NOTE — PATIENT INSTRUCTIONS
General Discharge Instructions   PLEASE READ YOUR DISCHARGE INSTRUCTIONS ENTIRELY AS IT CONTAINS IMPORTANT INFORMATION.  If you were prescribed a narcotic or controlled medication, do not drive or operate heavy equipment or machinery while taking these medications.  If you were prescribed antibiotics, please take them to completion.  You must understand that you've received an Urgent Care treatment only and that you may be released before all your medical problems are known or treated. You, the patient, will arrange for follow up care as instructed.    OVER THE COUNTER RECOMMENDATIONS/SUGGESTIONS.    Make sure to stay well hydrated.    Use Nasal Saline to mechanically move any post nasal drip from your eustachian tube or from the back of your throat.    Use warm salt water gargles to ease your throat pain. Warm salt water gargles as needed for sore throat- 1/2 tsp salt to 1 cup warm water, gargle as desired.    Use an antihistamine such as Claritin, Zyrtec or Allegra to dry you out.    Use pseudoephedrine (behind the counter) to decongest. Pseudoephedrine 30 mg up to 240 mg /day. It can raise your blood pressure and give you palpitations.    Use mucinex (guaifenesin) to break up mucous up to 2400mg/day to loosen any mucous.    The mucinex DM pill has a cough suppressant that can be sedating. It can be used at night to stop the tickle at the back of your throat.    You can use Mucinex D (it has guaifenesin and a high dose of pseudoephedrine) in the mornings to help decongest.    Use Afrin in each nare for no longer than 3 days, as it is addictive. It can also dry out your mucous membranes and cause elevated blood pressure. This is especially useful if you are flying.    Use Flonase 1-2 sprays/nostril per day. It is a local acting steroid nasal spray, if you develop a bloody nose, stop using the medication immediately.    Sometimes Nyquil at night is beneficial to help you get some rest, however it is sedating and it  does have an antihistamine, and tylenol.    Honey is a natural cough suppressant that can be used.    Tylenol up to 4,000 mg a day is safe for short periods and can be used for body aches, pain, and fever. However in high doses and prolonged use it can cause liver irritation.    Ibuprofen is a non-steroidal anti-inflammatory that can be used for body aches, pain, and fever.However it can also cause stomach irritation if over used.     Follow up with your PCP or specialty clinic as instructed in the next 2-3 days if not improved or as needed. You can call [phone] to schedule an appointment with appropriate provider.      If you condition worsens, we recommend that you receive another evaluation at the emergency room immediately or contact your primary medical clinic's after hours call service to discuss your concerns.      Please return here or go to the Emergency Department for any concerns or worsening condition.   You can also call [phone] to schedule an appointment with the appropriate provider.    Please return here or go to the Emergency Department for any concerns or worsening of condition.    Thank you for choosing [facility]!    Our goal in the Urgent Care is to always provide outstanding medical care. You may receive a survey by mail or e-mail in the next week regarding your experience today. We would greatly appreciate you completing and returning the survey. Your feedback provides us with a way to recognize our staff who provide very good care, and it helps us learn how to improve when your experience was below our aspiration of excellence.      We appreciate you trusting us with your medical care. We hope you feel better soon. We will be happy to take care of you for all of your future medical needs.    Sincerely,    STACI Jiménez                                             Abscess/Cellulitis   If your condition worsens or fails to improve we recommend that you receive  another evaluation at the ER immediately or contact your PCP to discuss your concerns or return here. You must understand that you've received an urgent care treatment only and that you may be released before all your medical problems are known or treated. You the patient will arrange for follouwp care as instructed.     Soak affected in warm water with epsom salts several times a day. Dilute 2 cups per gallon of water. If you cannot soak  apply warm compresses to the affected area for 20 min, 3-5 times per day and apply warm compresses frequently. If you cannot soak, use the shower head.  Use warm compresses for 20 minutes 3-5 times a day. Avoid picking or manipulating the wound. Clean the wound twice a day and put the antibiotic ointment on it. You should seek ER treatment if fever, increase pain, swelling or other signs of increasing infection.   If you were prescribed antibiotics, please take them to completion  If you are female and on BCP use additional methods to prevent pregnancy while on antibiotics and for one cycle after.   If you were given narcotics do not drive or operate heavy equipment or machinery while taking these medications.   Tylenol or ibuprofen can also be used as directed for pain unless you have an allergy to them or medical condition such as stomach ulcers, kidney or liver disease or blood thinners etc for which you should not be taking these type of medications.   Return in 48- 72 hours for recheck.

## 2022-05-24 DIAGNOSIS — B35.3 TINEA PEDIS OF BOTH FEET: ICD-10-CM

## 2022-05-24 NOTE — TELEPHONE ENCOUNTER
Refill Routing Note   Medication(s) are not appropriate for processing by [facility] for the following reason(s):      - Outside of protocol    [facility] action(s):  Route          Medication reconciliation completed: No     Appointments  past 12m or future 3m with PCP    Date Provider   Last Visit   Visit date not found Shravan Stapleton MD   Next Visit   Visit date not found Shravan Stapleton MD   ED visits in past 90 days: 0        Note composed:10:38 AM 05/24/2022

## 2022-05-27 RX ORDER — TERBINAFINE HYDROCHLORIDE 250 MG/1
250 TABLET ORAL DAILY
Qty: 90 TABLET | Refills: 0 | OUTPATIENT
Start: 2022-05-27

## 2022-06-07 ENCOUNTER — PATIENT MESSAGE (OUTPATIENT)
Dept: FAMILY MEDICINE | Facility: CLINIC | Age: 22
End: 2022-06-07
Payer: COMMERCIAL

## 2022-08-05 ENCOUNTER — TELEPHONE (OUTPATIENT)
Dept: FAMILY MEDICINE | Facility: CLINIC | Age: 22
End: 2022-08-05
Payer: COMMERCIAL

## 2022-08-05 ENCOUNTER — LAB VISIT (OUTPATIENT)
Dept: LAB | Facility: HOSPITAL | Age: 22
End: 2022-08-05
Attending: FAMILY MEDICINE
Payer: COMMERCIAL

## 2022-08-05 ENCOUNTER — PATIENT MESSAGE (OUTPATIENT)
Dept: DERMATOLOGY | Facility: CLINIC | Age: 22
End: 2022-08-05
Payer: COMMERCIAL

## 2022-08-05 ENCOUNTER — PATIENT MESSAGE (OUTPATIENT)
Dept: FAMILY MEDICINE | Facility: CLINIC | Age: 22
End: 2022-08-05
Payer: COMMERCIAL

## 2022-08-05 ENCOUNTER — TELEPHONE (OUTPATIENT)
Dept: DERMATOLOGY | Facility: CLINIC | Age: 22
End: 2022-08-05
Payer: COMMERCIAL

## 2022-08-05 DIAGNOSIS — F20.9 SCHIZOPHRENIA IN PARTIAL REMISSION WITH HISTORY OF MULTIPLE EPISODES: Primary | ICD-10-CM

## 2022-08-05 LAB
BASOPHILS # BLD AUTO: 0.02 K/UL (ref 0–0.2)
BASOPHILS NFR BLD: 0.4 % (ref 0–1.9)
CRP SERPL-MCNC: 1 MG/L (ref 0–3.19)
DIFFERENTIAL METHOD: ABNORMAL
EOSINOPHIL # BLD AUTO: 0.1 K/UL (ref 0–0.5)
EOSINOPHIL NFR BLD: 2.4 % (ref 0–8)
ERYTHROCYTE [DISTWIDTH] IN BLOOD BY AUTOMATED COUNT: 15.6 % (ref 11.5–14.5)
HCT VFR BLD AUTO: 45.3 % (ref 40–54)
HGB BLD-MCNC: 15 G/DL (ref 14–18)
IMM GRANULOCYTES # BLD AUTO: 0.03 K/UL (ref 0–0.04)
IMM GRANULOCYTES NFR BLD AUTO: 0.6 % (ref 0–0.5)
LYMPHOCYTES # BLD AUTO: 2.2 K/UL (ref 1–4.8)
LYMPHOCYTES NFR BLD: 42.4 % (ref 18–48)
MCH RBC QN AUTO: 29.9 PG (ref 27–31)
MCHC RBC AUTO-ENTMCNC: 33.1 G/DL (ref 32–36)
MCV RBC AUTO: 90 FL (ref 82–98)
MONOCYTES # BLD AUTO: 0.4 K/UL (ref 0.3–1)
MONOCYTES NFR BLD: 7.3 % (ref 4–15)
NEUTROPHILS # BLD AUTO: 2.4 K/UL (ref 1.8–7.7)
NEUTROPHILS NFR BLD: 46.9 % (ref 38–73)
NRBC BLD-RTO: 0 /100 WBC
PLATELET # BLD AUTO: 210 K/UL (ref 150–450)
PMV BLD AUTO: 10.8 FL (ref 9.2–12.9)
RBC # BLD AUTO: 5.02 M/UL (ref 4.6–6.2)
VALPROATE SERPL-MCNC: 92.9 UG/ML (ref 50–100)
WBC # BLD AUTO: 5.09 K/UL (ref 3.9–12.7)

## 2022-08-05 PROCEDURE — 80164 ASSAY DIPROPYLACETIC ACD TOT: CPT | Performed by: PSYCHIATRY & NEUROLOGY

## 2022-08-05 PROCEDURE — 86141 C-REACTIVE PROTEIN HS: CPT | Performed by: PSYCHIATRY & NEUROLOGY

## 2022-08-05 PROCEDURE — 36415 COLL VENOUS BLD VENIPUNCTURE: CPT | Mod: PO | Performed by: PSYCHIATRY & NEUROLOGY

## 2022-08-05 PROCEDURE — 84484 ASSAY OF TROPONIN QUANT: CPT | Performed by: PSYCHIATRY & NEUROLOGY

## 2022-08-05 PROCEDURE — 85025 COMPLETE CBC W/AUTO DIFF WBC: CPT | Performed by: PSYCHIATRY & NEUROLOGY

## 2022-08-05 PROCEDURE — 80159 DRUG ASSAY CLOZAPINE: CPT | Performed by: PSYCHIATRY & NEUROLOGY

## 2022-08-07 ENCOUNTER — PATIENT MESSAGE (OUTPATIENT)
Dept: FAMILY MEDICINE | Facility: CLINIC | Age: 22
End: 2022-08-07
Payer: COMMERCIAL

## 2022-08-07 LAB — TROPONIN T SERPL-MCNC: 10 NG/L

## 2022-08-08 LAB
CLOZAPINE SERPL-MCNC: 510 NG/ML (ref 350–600)
CLOZAPINE+NOR SERPL-MCNC: 718 NG/ML
NORCLOZAPINE SERPL-MCNC: 208 NG/ML

## 2022-08-18 ENCOUNTER — TELEPHONE (OUTPATIENT)
Dept: FAMILY MEDICINE | Facility: CLINIC | Age: 22
End: 2022-08-18
Payer: COMMERCIAL

## 2022-08-19 ENCOUNTER — OFFICE VISIT (OUTPATIENT)
Dept: FAMILY MEDICINE | Facility: CLINIC | Age: 22
End: 2022-08-19
Payer: COMMERCIAL

## 2022-08-19 DIAGNOSIS — Z02.89 ENCOUNTER FOR COMPLETION OF FORM WITH PATIENT: Primary | ICD-10-CM

## 2022-08-19 PROCEDURE — 3044F PR MOST RECENT HEMOGLOBIN A1C LEVEL <7.0%: ICD-10-PCS | Mod: CPTII,95,, | Performed by: FAMILY MEDICINE

## 2022-08-19 PROCEDURE — 99213 OFFICE O/P EST LOW 20 MIN: CPT | Mod: 95,,, | Performed by: FAMILY MEDICINE

## 2022-08-19 PROCEDURE — 1159F MED LIST DOCD IN RCRD: CPT | Mod: CPTII,95,, | Performed by: FAMILY MEDICINE

## 2022-08-19 PROCEDURE — 1160F PR REVIEW ALL MEDS BY PRESCRIBER/CLIN PHARMACIST DOCUMENTED: ICD-10-PCS | Mod: CPTII,95,, | Performed by: FAMILY MEDICINE

## 2022-08-19 PROCEDURE — 99213 PR OFFICE/OUTPT VISIT, EST, LEVL III, 20-29 MIN: ICD-10-PCS | Mod: 95,,, | Performed by: FAMILY MEDICINE

## 2022-08-19 PROCEDURE — 3044F HG A1C LEVEL LT 7.0%: CPT | Mod: CPTII,95,, | Performed by: FAMILY MEDICINE

## 2022-08-19 PROCEDURE — 1159F PR MEDICATION LIST DOCUMENTED IN MEDICAL RECORD: ICD-10-PCS | Mod: CPTII,95,, | Performed by: FAMILY MEDICINE

## 2022-08-19 PROCEDURE — 1160F RVW MEDS BY RX/DR IN RCRD: CPT | Mod: CPTII,95,, | Performed by: FAMILY MEDICINE

## 2022-08-19 NOTE — PROGRESS NOTES
Assessment & Plan  Encounter for completion of form with patient      90L completed and will be mailed to patient.   He has a follow up in October during which time he will discuss any concerns he has at that time.       The patient location is:  Patient Home   The chief complaint leading to consultation is: noted below  Visit type: Virtual visit with synchronous audio and video  Total time spent with patient: 20 minutes  Each patient to whom he or she provides medical services by telemedicine is:  (1) informed of the relationship between the physician and patient and the respective role of any other health care provider with respect to management of the patient; and (2) notified that he or she may decline to receive medical services by telemedicine and may withdraw from such care at any time.    Follow-up: Follow up if symptoms worsen or fail to improve.    ______________________________________________________________________    Chief Complaint  Chief Complaint   Patient presents with    Forms       HPI  Francisca Rm is a 21 y.o. male with multiple medical diagnoses as listed in the medical history and problem list that presents via a virtual visit for completion of his 90L.       PAST MEDICAL HISTORY:  Past Medical History:   Diagnosis Date    Autism spectrum disorder     Bipolar disorder     Hyperlipidemia     Postablative hypothyroidism     Schizophrenia        PAST SURGICAL HISTORY:  No past surgical history on file.    SOCIAL HISTORY:  Social History     Socioeconomic History    Marital status: Single   Tobacco Use    Smoking status: Current Every Day Smoker    Smokeless tobacco: Never Used   Substance and Sexual Activity    Alcohol use: Not Currently       FAMILY HISTORY:  No family history on file.    ALLERGIES AND MEDICATIONS: updated and reviewed.  Review of patient's allergies indicates:  No Known Allergies  Current Outpatient Medications   Medication Sig Dispense Refill    amantadine  HCl (SYMMETREL) 100 mg capsule       atenolol (TENORMIN) 50 MG tablet       cetirizine (ZYRTEC) 10 MG tablet Take 10 mg by mouth.      cloZAPine (CLOZARIL) 100 MG Tab Take 3 tablets by mouth every morning and 3 tablets every evening.      divalproex (DEPAKOTE) 500 MG TbEC Take by mouth.      EScitalopram oxalate (LEXAPRO) 10 MG tablet Take 10 mg by mouth once daily.      leucovorin (WELLCOVORIN) 25 MG Tab       levOCARNitine (CARNITOR) 330 mg Tab       levothyroxine (SYNTHROID, LEVOTHROID) 175 MCG tablet Take 1 tablet (175 mcg total) by mouth once daily. Please take as a single dose, on an empty stomach with glass of water, one-half to one hour before breakfast 90 tablet 3    metFORMIN (GLUCOPHAGE) 500 MG tablet Take 1 tablet (500 mg total) by mouth daily with breakfast. 90 tablet 3    omega-3 fatty acids-vitamin E 1,000 mg Cap Take 1,000 mg by mouth.      pravastatin (PRAVACHOL) 20 MG tablet Take 1 tablet (20 mg total) by mouth once daily. 90 tablet 3     No current facility-administered medications for this visit.         ROS  Review of Systems   Constitutional: Negative for activity change and unexpected weight change.   HENT: Negative for hearing loss, rhinorrhea and trouble swallowing.    Eyes: Negative for discharge and visual disturbance.   Respiratory: Negative for chest tightness and wheezing.    Cardiovascular: Positive for palpitations. Negative for chest pain.   Gastrointestinal: Positive for blood in stool. Negative for constipation, diarrhea and vomiting.   Endocrine: Positive for polydipsia and polyuria.   Genitourinary: Positive for difficulty urinating and urgency. Negative for hematuria.   Musculoskeletal: Positive for arthralgias. Negative for joint swelling.   Neurological: Positive for weakness and headaches.   Psychiatric/Behavioral: Positive for confusion and dysphoric mood.           Physical Exam  Physical Exam  Constitutional:       General: He is not in acute distress.  HENT:       Head: Normocephalic and atraumatic.   Neurological:      Mental Status: He is alert. Mental status is at baseline.   Psychiatric:         Mood and Affect: Mood normal.         Behavior: Behavior normal.         *Physical exam limited by virtual visit.    Health Maintenance       Date Due Completion Date    Pneumococcal Vaccines (Age 0-64) (1 - PCV) 10/10/2006 1/21/2002    Influenza Vaccine (1) 09/01/2022 12/7/2021    TETANUS VACCINE 12/07/2031 12/7/2021

## 2022-08-20 RX ORDER — ESCITALOPRAM OXALATE 20 MG/1
20 TABLET ORAL DAILY
COMMUNITY
Start: 2022-06-04 | End: 2023-08-08 | Stop reason: DRUGHIGH

## 2022-08-22 ENCOUNTER — TELEPHONE (OUTPATIENT)
Dept: DERMATOLOGY | Facility: CLINIC | Age: 22
End: 2022-08-22
Payer: COMMERCIAL

## 2022-08-22 ENCOUNTER — TELEPHONE (OUTPATIENT)
Dept: FAMILY MEDICINE | Facility: CLINIC | Age: 22
End: 2022-08-22
Payer: COMMERCIAL

## 2022-08-22 NOTE — TELEPHONE ENCOUNTER
----- Message from Jada Malik LPN sent at 8/19/2022  3:48 PM CDT -----  Regarding: FW: appt    ----- Message -----  From: Ebenezer Maxwell LPN  Sent: 8/19/2022   9:19 AM CDT  To: Tahmina Valdes Staff  Subject: FW: appt                                           ----- Message -----  From: Alexi Cao  Sent: 8/19/2022   8:52 AM CDT  To: Shashank Coronado Staff  Subject: appt                                             Type:  Patient Returning Call    Who Called: patient mother     Who Left Message for Patient:n/a    Does the patient know what this is regarding?:reschedule appt     Would the patient rather a call back or a response via [facility]? Call back     Best Call Back Number:[phone]  Additional Information: n/a

## 2022-08-31 ENCOUNTER — TELEPHONE (OUTPATIENT)
Dept: ENDOCRINOLOGY | Facility: CLINIC | Age: 22
End: 2022-08-31

## 2022-08-31 ENCOUNTER — PATIENT MESSAGE (OUTPATIENT)
Dept: ENDOCRINOLOGY | Facility: CLINIC | Age: 22
End: 2022-08-31
Payer: COMMERCIAL

## 2022-08-31 DIAGNOSIS — E89.0 POSTABLATIVE HYPOTHYROIDISM: Primary | ICD-10-CM

## 2022-08-31 DIAGNOSIS — E66.9 CLASS 2 OBESITY WITHOUT SERIOUS COMORBIDITY WITH BODY MASS INDEX (BMI) OF 36.0 TO 36.9 IN ADULT, UNSPECIFIED OBESITY TYPE: ICD-10-CM

## 2022-08-31 NOTE — TELEPHONE ENCOUNTER
----- Message from Shae Boateng sent at 8/30/2022  5:21 PM CDT -----  Type:  Patient Returning Call    Who Called:pt     Does the patient know what this is regarding?lab  Would the patient rather a call back or a response via [facility]? [facility]  Best Call Back Number:[phone]  Additional Information: pt would like to know if pt can get the orders for tsh put in  for appt tommorow and if not pt would like to reschedule appt

## 2022-08-31 NOTE — TELEPHONE ENCOUNTER
Called back , pt mother answered assisted with scheduling labs . Unable to schedule visit sooner then Dec due Oct provider is out on vacation whole month . Please advise

## 2022-09-15 ENCOUNTER — OFFICE VISIT (OUTPATIENT)
Dept: DERMATOLOGY | Facility: CLINIC | Age: 22
End: 2022-09-15
Payer: COMMERCIAL

## 2022-09-15 DIAGNOSIS — L90.5 SCAR: Primary | ICD-10-CM

## 2022-09-15 PROCEDURE — 1159F MED LIST DOCD IN RCRD: CPT | Mod: CPTII,S$GLB,, | Performed by: DERMATOLOGY

## 2022-09-15 PROCEDURE — 99999 PR PBB SHADOW E&M-EST. PATIENT-LVL III: ICD-10-PCS | Mod: PBBFAC,,, | Performed by: DERMATOLOGY

## 2022-09-15 PROCEDURE — 3044F PR MOST RECENT HEMOGLOBIN A1C LEVEL <7.0%: ICD-10-PCS | Mod: CPTII,S$GLB,, | Performed by: DERMATOLOGY

## 2022-09-15 PROCEDURE — 3044F HG A1C LEVEL LT 7.0%: CPT | Mod: CPTII,S$GLB,, | Performed by: DERMATOLOGY

## 2022-09-15 PROCEDURE — 1159F PR MEDICATION LIST DOCUMENTED IN MEDICAL RECORD: ICD-10-PCS | Mod: CPTII,S$GLB,, | Performed by: DERMATOLOGY

## 2022-09-15 PROCEDURE — 99999 PR PBB SHADOW E&M-EST. PATIENT-LVL III: CPT | Mod: PBBFAC,,, | Performed by: DERMATOLOGY

## 2022-09-15 PROCEDURE — 99202 PR OFFICE/OUTPT VISIT, NEW, LEVL II, 15-29 MIN: ICD-10-PCS | Mod: S$GLB,,, | Performed by: DERMATOLOGY

## 2022-09-15 PROCEDURE — 99202 OFFICE O/P NEW SF 15 MIN: CPT | Mod: S$GLB,,, | Performed by: DERMATOLOGY

## 2022-09-15 NOTE — PROGRESS NOTES
Subjective:       Patient ID:  Francisca Rm is a 21 y.o. male who presents for   Chief Complaint   Patient presents with    Cyst     Cyst - Initial  Affected locations: left buttock and right buttock  Duration: 4 months  Signs / symptoms: asymptomatic  Severity: mild  Timing: intermittent  Aggravated by: nothing  Relieving factors/Treatments tried: nothing    Review of Systems   Constitutional:  Negative for fever, chills, fatigue and malaise.      Objective:    Physical Exam   Constitutional: He appears well-developed and well-nourished. No distress.   Neurological: He is alert and oriented to person, place, and time. He is not disoriented.   Psychiatric: He has a normal mood and affect.   Skin:   Areas Examined (abnormalities noted in diagram):   Genitals / Buttocks / Groin Inspection Performed            Diagram Legend     Erythematous scaling macule/papule c/w actinic keratosis       Vascular papule c/w angioma      Pigmented verrucoid papule/plaque c/w seborrheic keratosis      Yellow umbilicated papule c/w sebaceous hyperplasia      Irregularly shaped tan macule c/w lentigo     1-2 mm smooth white papules consistent with Milia      Movable subcutaneous cyst with punctum c/w epidermal inclusion cyst      Subcutaneous movable cyst c/w pilar cyst      Firm pink to brown papule c/w dermatofibroma      Pedunculated fleshy papule(s) c/w skin tag(s)      Evenly pigmented macule c/w junctional nevus     Mildly variegated pigmented, slightly irregular-bordered macule c/w mildly atypical nevus      Flesh colored to evenly pigmented papule c/w intradermal nevus       Pink pearly papule/plaque c/w basal cell carcinoma      Erythematous hyperkeratotic cursted plaque c/w SCC      Surgical scar with no sign of skin cancer recurrence      Open and closed comedones      Inflammatory papules and pustules      Verrucoid papule consistent consistent with wart     Erythematous eczematous patches and plaques      Dystrophic onycholytic nail with subungual debris c/w onychomycosis     Umbilicated papule    Erythematous-base heme-crusted tan verrucoid plaque consistent with inflamed seborrheic keratosis     Erythematous Silvery Scaling Plaque c/w Psoriasis     See annotation      Assessment / Plan:        Scar  - a cyst is not present today on exam in area of possibly pilonidal cyst, counseled patient it was drained but may come back and if it does to see general surgery. General surgery no given to patient and family.          No follow-ups on file.

## 2022-10-01 ENCOUNTER — LAB VISIT (OUTPATIENT)
Dept: LAB | Facility: HOSPITAL | Age: 22
End: 2022-10-01
Attending: FAMILY MEDICINE
Payer: COMMERCIAL

## 2022-10-01 DIAGNOSIS — R73.03 PRE-DIABETES: ICD-10-CM

## 2022-10-01 DIAGNOSIS — E66.9 CLASS 2 OBESITY WITHOUT SERIOUS COMORBIDITY WITH BODY MASS INDEX (BMI) OF 36.0 TO 36.9 IN ADULT, UNSPECIFIED OBESITY TYPE: ICD-10-CM

## 2022-10-01 DIAGNOSIS — E89.0 POSTABLATIVE HYPOTHYROIDISM: ICD-10-CM

## 2022-10-01 DIAGNOSIS — E78.1 HYPERTRIGLYCERIDEMIA: ICD-10-CM

## 2022-10-01 LAB
25(OH)D3+25(OH)D2 SERPL-MCNC: 11 NG/ML (ref 30–96)
CHOLEST SERPL-MCNC: 97 MG/DL (ref 120–199)
CHOLEST/HDLC SERPL: 2.6 {RATIO} (ref 2–5)
ESTIMATED AVG GLUCOSE: 108 MG/DL (ref 68–131)
HBA1C MFR BLD: 5.4 % (ref 4–5.6)
HDLC SERPL-MCNC: 37 MG/DL (ref 40–75)
HDLC SERPL: 38.1 % (ref 20–50)
LDLC SERPL CALC-MCNC: 45 MG/DL (ref 63–159)
NONHDLC SERPL-MCNC: 60 MG/DL
T4 FREE SERPL-MCNC: 1.02 NG/DL (ref 0.71–1.51)
TRIGL SERPL-MCNC: 75 MG/DL (ref 30–150)
TSH SERPL DL<=0.005 MIU/L-ACNC: 0.15 UIU/ML (ref 0.4–4)

## 2022-10-01 PROCEDURE — 36415 COLL VENOUS BLD VENIPUNCTURE: CPT | Mod: PO | Performed by: HOSPITALIST

## 2022-10-01 PROCEDURE — 84443 ASSAY THYROID STIM HORMONE: CPT | Performed by: HOSPITALIST

## 2022-10-01 PROCEDURE — 80061 LIPID PANEL: CPT | Performed by: FAMILY MEDICINE

## 2022-10-01 PROCEDURE — 82306 VITAMIN D 25 HYDROXY: CPT | Performed by: HOSPITALIST

## 2022-10-01 PROCEDURE — 83036 HEMOGLOBIN GLYCOSYLATED A1C: CPT | Performed by: FAMILY MEDICINE

## 2022-10-01 PROCEDURE — 84439 ASSAY OF FREE THYROXINE: CPT | Performed by: HOSPITALIST

## 2022-10-05 ENCOUNTER — PATIENT MESSAGE (OUTPATIENT)
Dept: ENDOCRINOLOGY | Facility: CLINIC | Age: 22
End: 2022-10-05
Payer: COMMERCIAL

## 2022-10-06 ENCOUNTER — OFFICE VISIT (OUTPATIENT)
Dept: FAMILY MEDICINE | Facility: CLINIC | Age: 22
End: 2022-10-06
Payer: COMMERCIAL

## 2022-10-06 VITALS
HEIGHT: 64 IN | DIASTOLIC BLOOD PRESSURE: 72 MMHG | BODY MASS INDEX: 37.8 KG/M2 | TEMPERATURE: 99 F | WEIGHT: 221.44 LBS | OXYGEN SATURATION: 97 % | HEART RATE: 110 BPM | SYSTOLIC BLOOD PRESSURE: 110 MMHG

## 2022-10-06 DIAGNOSIS — Z23 NEED FOR VACCINATION AGAINST STREPTOCOCCUS PNEUMONIAE: ICD-10-CM

## 2022-10-06 DIAGNOSIS — Z23 NEEDS FLU SHOT: ICD-10-CM

## 2022-10-06 DIAGNOSIS — E78.5 DYSLIPIDEMIA: ICD-10-CM

## 2022-10-06 DIAGNOSIS — Z86.39 HISTORY OF GRAVES' DISEASE: ICD-10-CM

## 2022-10-06 DIAGNOSIS — E66.01 CLASS 2 SEVERE OBESITY DUE TO EXCESS CALORIES WITH SERIOUS COMORBIDITY AND BODY MASS INDEX (BMI) OF 38.0 TO 38.9 IN ADULT: ICD-10-CM

## 2022-10-06 DIAGNOSIS — I10 PRIMARY HYPERTENSION: ICD-10-CM

## 2022-10-06 DIAGNOSIS — E55.9 HYPOVITAMINOSIS D: ICD-10-CM

## 2022-10-06 DIAGNOSIS — F41.1 GAD (GENERALIZED ANXIETY DISORDER): Primary | ICD-10-CM

## 2022-10-06 DIAGNOSIS — R73.03 PRE-DIABETES: ICD-10-CM

## 2022-10-06 PROCEDURE — 90472 IMMUNIZATION ADMIN EACH ADD: CPT | Mod: S$GLB,,, | Performed by: FAMILY MEDICINE

## 2022-10-06 PROCEDURE — 90471 IMMUNIZATION ADMIN: CPT | Mod: S$GLB,,, | Performed by: FAMILY MEDICINE

## 2022-10-06 PROCEDURE — 1159F PR MEDICATION LIST DOCUMENTED IN MEDICAL RECORD: ICD-10-PCS | Mod: CPTII,S$GLB,, | Performed by: FAMILY MEDICINE

## 2022-10-06 PROCEDURE — 90471 FLU VACCINE (QUAD) GREATER THAN OR EQUAL TO 3YO PRESERVATIVE FREE IM: ICD-10-PCS | Mod: S$GLB,,, | Performed by: FAMILY MEDICINE

## 2022-10-06 PROCEDURE — 3078F DIAST BP <80 MM HG: CPT | Mod: CPTII,S$GLB,, | Performed by: FAMILY MEDICINE

## 2022-10-06 PROCEDURE — 99999 PR PBB SHADOW E&M-EST. PATIENT-LVL IV: CPT | Mod: PBBFAC,,, | Performed by: FAMILY MEDICINE

## 2022-10-06 PROCEDURE — 99214 PR OFFICE/OUTPT VISIT, EST, LEVL IV, 30-39 MIN: ICD-10-PCS | Mod: 25,S$GLB,, | Performed by: FAMILY MEDICINE

## 2022-10-06 PROCEDURE — 90677 PNEUMOCOCCAL CONJUGATE VACCINE 20-VALENT: ICD-10-PCS | Mod: S$GLB,,, | Performed by: FAMILY MEDICINE

## 2022-10-06 PROCEDURE — 1160F PR REVIEW ALL MEDS BY PRESCRIBER/CLIN PHARMACIST DOCUMENTED: ICD-10-PCS | Mod: CPTII,S$GLB,, | Performed by: FAMILY MEDICINE

## 2022-10-06 PROCEDURE — 3074F SYST BP LT 130 MM HG: CPT | Mod: CPTII,S$GLB,, | Performed by: FAMILY MEDICINE

## 2022-10-06 PROCEDURE — 1159F MED LIST DOCD IN RCRD: CPT | Mod: CPTII,S$GLB,, | Performed by: FAMILY MEDICINE

## 2022-10-06 PROCEDURE — 90686 FLU VACCINE (QUAD) GREATER THAN OR EQUAL TO 3YO PRESERVATIVE FREE IM: ICD-10-PCS | Mod: S$GLB,,, | Performed by: FAMILY MEDICINE

## 2022-10-06 PROCEDURE — 90677 PCV20 VACCINE IM: CPT | Mod: S$GLB,,, | Performed by: FAMILY MEDICINE

## 2022-10-06 PROCEDURE — 3044F HG A1C LEVEL LT 7.0%: CPT | Mod: CPTII,S$GLB,, | Performed by: FAMILY MEDICINE

## 2022-10-06 PROCEDURE — 90472 PNEUMOCOCCAL CONJUGATE VACCINE 20-VALENT: ICD-10-PCS | Mod: S$GLB,,, | Performed by: FAMILY MEDICINE

## 2022-10-06 PROCEDURE — 1160F RVW MEDS BY RX/DR IN RCRD: CPT | Mod: CPTII,S$GLB,, | Performed by: FAMILY MEDICINE

## 2022-10-06 PROCEDURE — 3078F PR MOST RECENT DIASTOLIC BLOOD PRESSURE < 80 MM HG: ICD-10-PCS | Mod: CPTII,S$GLB,, | Performed by: FAMILY MEDICINE

## 2022-10-06 PROCEDURE — 3074F PR MOST RECENT SYSTOLIC BLOOD PRESSURE < 130 MM HG: ICD-10-PCS | Mod: CPTII,S$GLB,, | Performed by: FAMILY MEDICINE

## 2022-10-06 PROCEDURE — 90686 IIV4 VACC NO PRSV 0.5 ML IM: CPT | Mod: S$GLB,,, | Performed by: FAMILY MEDICINE

## 2022-10-06 PROCEDURE — 99999 PR PBB SHADOW E&M-EST. PATIENT-LVL IV: ICD-10-PCS | Mod: PBBFAC,,, | Performed by: FAMILY MEDICINE

## 2022-10-06 PROCEDURE — 3008F PR BODY MASS INDEX (BMI) DOCUMENTED: ICD-10-PCS | Mod: CPTII,S$GLB,, | Performed by: FAMILY MEDICINE

## 2022-10-06 PROCEDURE — 3044F PR MOST RECENT HEMOGLOBIN A1C LEVEL <7.0%: ICD-10-PCS | Mod: CPTII,S$GLB,, | Performed by: FAMILY MEDICINE

## 2022-10-06 PROCEDURE — 3008F BODY MASS INDEX DOCD: CPT | Mod: CPTII,S$GLB,, | Performed by: FAMILY MEDICINE

## 2022-10-06 PROCEDURE — 99214 OFFICE O/P EST MOD 30 MIN: CPT | Mod: 25,S$GLB,, | Performed by: FAMILY MEDICINE

## 2022-10-06 RX ORDER — PROPRANOLOL HYDROCHLORIDE 10 MG/1
10 TABLET ORAL 2 TIMES DAILY
Qty: 60 TABLET | Refills: 11 | Status: SHIPPED | OUTPATIENT
Start: 2022-10-06 | End: 2022-11-15 | Stop reason: SDUPTHER
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
## 2022-10-25 ENCOUNTER — PATIENT MESSAGE (OUTPATIENT)
Dept: FAMILY MEDICINE | Facility: CLINIC | Age: 22
End: 2022-10-25
Payer: COMMERCIAL

## 2022-10-25 DIAGNOSIS — F41.1 GAD (GENERALIZED ANXIETY DISORDER): ICD-10-CM

## 2022-10-25 DIAGNOSIS — I10 PRIMARY HYPERTENSION: ICD-10-CM

## 2022-11-14 ENCOUNTER — PATIENT MESSAGE (OUTPATIENT)
Dept: FAMILY MEDICINE | Facility: CLINIC | Age: 22
End: 2022-11-14
Payer: COMMERCIAL

## 2022-11-15 ENCOUNTER — OFFICE VISIT (OUTPATIENT)
Dept: FAMILY MEDICINE | Facility: CLINIC | Age: 22
End: 2022-11-15
Payer: COMMERCIAL

## 2022-11-15 VITALS
WEIGHT: 224 LBS | HEART RATE: 102 BPM | SYSTOLIC BLOOD PRESSURE: 116 MMHG | TEMPERATURE: 98 F | BODY MASS INDEX: 38.24 KG/M2 | HEIGHT: 64 IN | OXYGEN SATURATION: 97 % | DIASTOLIC BLOOD PRESSURE: 68 MMHG

## 2022-11-15 DIAGNOSIS — E66.01 CLASS 2 SEVERE OBESITY DUE TO EXCESS CALORIES WITH SERIOUS COMORBIDITY AND BODY MASS INDEX (BMI) OF 38.0 TO 38.9 IN ADULT: ICD-10-CM

## 2022-11-15 DIAGNOSIS — F90.9 ATTENTION DEFICIT HYPERACTIVITY DISORDER (ADHD), UNSPECIFIED ADHD TYPE: ICD-10-CM

## 2022-11-15 DIAGNOSIS — F41.1 GAD (GENERALIZED ANXIETY DISORDER): ICD-10-CM

## 2022-11-15 DIAGNOSIS — I10 PRIMARY HYPERTENSION: ICD-10-CM

## 2022-11-15 DIAGNOSIS — F32.9 MAJOR DEPRESSIVE DISORDER, REMISSION STATUS UNSPECIFIED, UNSPECIFIED WHETHER RECURRENT: Primary | ICD-10-CM

## 2022-11-15 DIAGNOSIS — F99 PSYCHIATRIC DIAGNOSIS: ICD-10-CM

## 2022-11-15 DIAGNOSIS — F25.9 SCHIZOAFFECTIVE DISORDER, UNSPECIFIED TYPE: Chronic | ICD-10-CM

## 2022-11-15 PROCEDURE — 1159F PR MEDICATION LIST DOCUMENTED IN MEDICAL RECORD: ICD-10-PCS | Mod: CPTII,S$GLB,, | Performed by: NURSE PRACTITIONER

## 2022-11-15 PROCEDURE — 3074F PR MOST RECENT SYSTOLIC BLOOD PRESSURE < 130 MM HG: ICD-10-PCS | Mod: CPTII,S$GLB,, | Performed by: NURSE PRACTITIONER

## 2022-11-15 PROCEDURE — 99999 PR PBB SHADOW E&M-EST. PATIENT-LVL V: ICD-10-PCS | Mod: PBBFAC,,, | Performed by: NURSE PRACTITIONER

## 2022-11-15 PROCEDURE — 99214 OFFICE O/P EST MOD 30 MIN: CPT | Mod: S$GLB,,, | Performed by: NURSE PRACTITIONER

## 2022-11-15 PROCEDURE — 1159F MED LIST DOCD IN RCRD: CPT | Mod: CPTII,S$GLB,, | Performed by: NURSE PRACTITIONER

## 2022-11-15 PROCEDURE — 93010 EKG 12-LEAD: ICD-10-PCS | Mod: S$GLB,,, | Performed by: INTERNAL MEDICINE

## 2022-11-15 PROCEDURE — 3044F HG A1C LEVEL LT 7.0%: CPT | Mod: CPTII,S$GLB,, | Performed by: NURSE PRACTITIONER

## 2022-11-15 PROCEDURE — 1160F RVW MEDS BY RX/DR IN RCRD: CPT | Mod: CPTII,S$GLB,, | Performed by: NURSE PRACTITIONER

## 2022-11-15 PROCEDURE — 1160F PR REVIEW ALL MEDS BY PRESCRIBER/CLIN PHARMACIST DOCUMENTED: ICD-10-PCS | Mod: CPTII,S$GLB,, | Performed by: NURSE PRACTITIONER

## 2022-11-15 PROCEDURE — 99214 PR OFFICE/OUTPT VISIT, EST, LEVL IV, 30-39 MIN: ICD-10-PCS | Mod: S$GLB,,, | Performed by: NURSE PRACTITIONER

## 2022-11-15 PROCEDURE — 93005 ELECTROCARDIOGRAM TRACING: CPT | Mod: S$GLB,,, | Performed by: NURSE PRACTITIONER

## 2022-11-15 PROCEDURE — 3074F SYST BP LT 130 MM HG: CPT | Mod: CPTII,S$GLB,, | Performed by: NURSE PRACTITIONER

## 2022-11-15 PROCEDURE — 93005 EKG 12-LEAD: ICD-10-PCS | Mod: S$GLB,,, | Performed by: NURSE PRACTITIONER

## 2022-11-15 PROCEDURE — 99499 RISK ADDL DX/OHS AUDIT: ICD-10-PCS | Mod: S$GLB,,, | Performed by: NURSE PRACTITIONER

## 2022-11-15 PROCEDURE — 99999 PR PBB SHADOW E&M-EST. PATIENT-LVL V: CPT | Mod: PBBFAC,,, | Performed by: NURSE PRACTITIONER

## 2022-11-15 PROCEDURE — 3008F BODY MASS INDEX DOCD: CPT | Mod: CPTII,S$GLB,, | Performed by: NURSE PRACTITIONER

## 2022-11-15 PROCEDURE — 3078F DIAST BP <80 MM HG: CPT | Mod: CPTII,S$GLB,, | Performed by: NURSE PRACTITIONER

## 2022-11-15 PROCEDURE — 99499 UNLISTED E&M SERVICE: CPT | Mod: S$GLB,,, | Performed by: NURSE PRACTITIONER

## 2022-11-15 PROCEDURE — 93010 ELECTROCARDIOGRAM REPORT: CPT | Mod: S$GLB,,, | Performed by: INTERNAL MEDICINE

## 2022-11-15 PROCEDURE — 3044F PR MOST RECENT HEMOGLOBIN A1C LEVEL <7.0%: ICD-10-PCS | Mod: CPTII,S$GLB,, | Performed by: NURSE PRACTITIONER

## 2022-11-15 PROCEDURE — 3008F PR BODY MASS INDEX (BMI) DOCUMENTED: ICD-10-PCS | Mod: CPTII,S$GLB,, | Performed by: NURSE PRACTITIONER

## 2022-11-15 PROCEDURE — 3078F PR MOST RECENT DIASTOLIC BLOOD PRESSURE < 80 MM HG: ICD-10-PCS | Mod: CPTII,S$GLB,, | Performed by: NURSE PRACTITIONER

## 2022-11-15 RX ORDER — PROPRANOLOL HYDROCHLORIDE 10 MG/1
20 TABLET ORAL 2 TIMES DAILY
Qty: 120 TABLET | Refills: 11 | Status: SHIPPED | OUTPATIENT
Start: 2022-11-15 | End: 2024-01-12
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
## 2022-11-17 ENCOUNTER — TELEPHONE (OUTPATIENT)
Dept: FAMILY MEDICINE | Facility: CLINIC | Age: 22
End: 2022-11-17
Payer: COMMERCIAL

## 2022-11-17 NOTE — TELEPHONE ENCOUNTER
F/u with pt's mother as promised regarding eligibility for the HTN digital medicine program. Questions answered. Pt's mother verbalized understanding.

## 2023-02-14 ENCOUNTER — OFFICE VISIT (OUTPATIENT)
Dept: ENDOCRINOLOGY | Facility: CLINIC | Age: 23
End: 2023-02-14
Payer: MEDICARE

## 2023-02-14 VITALS
WEIGHT: 221.81 LBS | SYSTOLIC BLOOD PRESSURE: 133 MMHG | HEART RATE: 121 BPM | BODY MASS INDEX: 38.07 KG/M2 | DIASTOLIC BLOOD PRESSURE: 89 MMHG | TEMPERATURE: 99 F

## 2023-02-14 DIAGNOSIS — F41.1 GAD (GENERALIZED ANXIETY DISORDER): ICD-10-CM

## 2023-02-14 DIAGNOSIS — E66.01 CLASS 2 SEVERE OBESITY DUE TO EXCESS CALORIES WITH SERIOUS COMORBIDITY AND BODY MASS INDEX (BMI) OF 38.0 TO 38.9 IN ADULT: ICD-10-CM

## 2023-02-14 DIAGNOSIS — R73.01 IFG (IMPAIRED FASTING GLUCOSE): ICD-10-CM

## 2023-02-14 DIAGNOSIS — I10 PRIMARY HYPERTENSION: ICD-10-CM

## 2023-02-14 DIAGNOSIS — F25.9 SCHIZOAFFECTIVE DISORDER, UNSPECIFIED TYPE: Chronic | ICD-10-CM

## 2023-02-14 DIAGNOSIS — E89.0 POSTABLATIVE HYPOTHYROIDISM: Primary | ICD-10-CM

## 2023-02-14 DIAGNOSIS — E55.9 VITAMIN D DEFICIENCY: ICD-10-CM

## 2023-02-14 PROBLEM — E66.812 CLASS 2 SEVERE OBESITY DUE TO EXCESS CALORIES WITH SERIOUS COMORBIDITY AND BODY MASS INDEX (BMI) OF 38.0 TO 38.9 IN ADULT: Status: ACTIVE | Noted: 2023-02-14

## 2023-02-14 PROCEDURE — 1159F MED LIST DOCD IN RCRD: CPT | Mod: CPTII,S$GLB,, | Performed by: HOSPITALIST

## 2023-02-14 PROCEDURE — 1160F PR REVIEW ALL MEDS BY PRESCRIBER/CLIN PHARMACIST DOCUMENTED: ICD-10-PCS | Mod: CPTII,S$GLB,, | Performed by: HOSPITALIST

## 2023-02-14 PROCEDURE — 3079F DIAST BP 80-89 MM HG: CPT | Mod: CPTII,S$GLB,, | Performed by: HOSPITALIST

## 2023-02-14 PROCEDURE — 1160F RVW MEDS BY RX/DR IN RCRD: CPT | Mod: CPTII,S$GLB,, | Performed by: HOSPITALIST

## 2023-02-14 PROCEDURE — 3079F PR MOST RECENT DIASTOLIC BLOOD PRESSURE 80-89 MM HG: ICD-10-PCS | Mod: CPTII,S$GLB,, | Performed by: HOSPITALIST

## 2023-02-14 PROCEDURE — 3008F PR BODY MASS INDEX (BMI) DOCUMENTED: ICD-10-PCS | Mod: CPTII,S$GLB,, | Performed by: HOSPITALIST

## 2023-02-14 PROCEDURE — 3075F PR MOST RECENT SYSTOLIC BLOOD PRESS GE 130-139MM HG: ICD-10-PCS | Mod: CPTII,S$GLB,, | Performed by: HOSPITALIST

## 2023-02-14 PROCEDURE — 3075F SYST BP GE 130 - 139MM HG: CPT | Mod: CPTII,S$GLB,, | Performed by: HOSPITALIST

## 2023-02-14 PROCEDURE — 99214 OFFICE O/P EST MOD 30 MIN: CPT | Mod: S$GLB,,, | Performed by: HOSPITALIST

## 2023-02-14 PROCEDURE — 99999 PR PBB SHADOW E&M-EST. PATIENT-LVL IV: CPT | Mod: PBBFAC,,, | Performed by: HOSPITALIST

## 2023-02-14 PROCEDURE — 99214 PR OFFICE/OUTPT VISIT, EST, LEVL IV, 30-39 MIN: ICD-10-PCS | Mod: S$GLB,,, | Performed by: HOSPITALIST

## 2023-02-14 PROCEDURE — 99999 PR PBB SHADOW E&M-EST. PATIENT-LVL IV: ICD-10-PCS | Mod: PBBFAC,,, | Performed by: HOSPITALIST

## 2023-02-14 PROCEDURE — 3008F BODY MASS INDEX DOCD: CPT | Mod: CPTII,S$GLB,, | Performed by: HOSPITALIST

## 2023-02-14 PROCEDURE — 1159F PR MEDICATION LIST DOCUMENTED IN MEDICAL RECORD: ICD-10-PCS | Mod: CPTII,S$GLB,, | Performed by: HOSPITALIST

## 2023-02-14 NOTE — ASSESSMENT & PLAN NOTE
- Pt has a known h/o hypothyroidism diagnosed due to  post-surgical in setting of Graves disease as reported  - patient's medications being managed by his mother due to longstanding psychiatric illness  - overall off and on compliance due to missed doses on the weekend  - Last TSH:  Chart review showed elevation in TSH noted, in (Care Everywhere section)  - Clinically unable to evaluate symptoms of hypothyroidism due to psychiatric illness  - Weight based dose is dosage (1.6 mcg/kg = 161  mcg)    Plan  - long discussion with mother about proper administration and direction on levothyroxine, she is aware  Advised to not skip doses if possible  - continue for now levothyroxine 175 mcg 6 days a week.  Skipping Sunday.  - we will repeat TFTs tomorrow to re-evaluate thyroid function before making further adjustment   - close follow-up in 4-5 months, pending results above

## 2023-02-14 NOTE — PROGRESS NOTES
Subjective:      Patient ID: Francisca Rm is a 22 y.o. male presented to [facility] on 2/14/2023.  Chief Complaint:  Hypothyroidism    History of Present Illness: Francisca Rm is a 22 y.o. male here for  postablative hypothyroidism due to Graves disease  Other significant past medical history:  Schizoaffective disorder, obesity, tobacco user  Mother provided main history      Interval history:Patient and mother present for discuss hypothyroidism.  Medication compliant at this time.    The work concern for behavioral issue due to too much thyroid medication.  Levothyroxine dose was decreased to 6 pill weekly.  Patient mother did report back an improvement in his behavior and anxiety level.  Patient also currently taking propanol , still with tachycardia  Some weight loss, current weight 221  Mood up and and down, easily agitated   No increase in BM       1) Postablative hypothyroidism:   - 2017 had Graves disease>> Had VASQUEZ ablation in 2017  - Treated at [facility] here in town  - Had psych issue at that time which masked his hyperthyroidism symptoms  - Family history thyroid disorder: maternal aunt  - Thyroid goiter: no  - Previous US/FNA: no  - Current medication: Generic levothyroxine 175 mcg 6 days a week, skipping Sunday     >> mom gives him his medication> does not miss doses>> forget give it on Satuday and Sunday at least 1-2x a week>> she will give it to him in the afternoon  Take pills without any issue  Take 30 min before any other medications     US thyroid 9/2017  THYROID ULTRASOUND: The thyroid gland appears mildly enlarged with right thyroid lobe. measured at 5.4 x 1.3 x 2.2 cm with thyroid volume estimated at 7.3 mL's. The left thyroid lobe is 4.9 x 1.1 x 1.8 cm with left thyroid volume estimated at 4.7 mL's. There is mild diffuse heterogeneity without obvious nodule or cyst. There is no significant increased blood flow or additional abnormality. There is no  significant adjacent adenopathy.     IMPRESSION: MILD DIFFUSE HETEROGENEITY AND ENLARGEMENT WITHOUT FOCAL DISEASE    Due to cognition, he does not report symptoms  Mom with noticed that patient would get more agitated or increase in diaphoresis on occasion>> if there are thyroid issue  No increases in weight recently.  No changes in mental status or behavior    Lab Results   Component Value Date    TSH 0.153 (L) 10/01/2022    FREET4 1.02 10/01/2022         2) Vit D Def  - Vit D3 [id number] 5 days a week    Lab Results   Component Value Date    [id number] 11 (L) 10/01/2022    CALCIUM 9.5 03/25/2022    CALCIUM 9.8 12/08/2021    TSH 0.153 (L) 10/01/2022      3) prediabetes  - per patient's mother on metformin daily   - started by PCP    Lab Results   Component Value Date    HGBA1C 5.4 10/01/2022       Reviewed past surgical, medical, family, social history and updated as appropriate.  Review of Systems: see HPI above    Objective:   /89 (BP Location: Left arm)   Pulse (!) 121   Temp 98.5 °F (36.9 °C) (Oral)   Wt 100.6 kg (221 lb 12.8 oz)   BMI 38.07 kg/m²     Body mass index is 38.07 kg/m².  Vital signs reviewed    Physical Exam  Vitals and nursing note reviewed.   Constitutional:       Appearance: Normal appearance. He is well-developed. He is obese. He is not ill-appearing.   Neck:      Thyroid: No thyromegaly.   Pulmonary:      Effort: Pulmonary effort is normal. No respiratory distress.   Musculoskeletal:         General: Normal range of motion.      Cervical back: Normal range of motion.   Neurological:      General: No focal deficit present.      Mental Status: He is alert. Mental status is at baseline.   Psychiatric:         Mood and Affect: Mood normal.         Behavior: Behavior normal.     Lab Reviewed:   Lab Results   Component Value Date    HGBA1C 5.4 10/01/2022     Lab Results   Component Value Date    CHOL 97 (L) 10/01/2022    HDL 37 (L) 10/01/2022    LDLCALC 45.0 (L) 10/01/2022    TRIG 75  10/01/2022    CHOLHDL 38.1 10/01/2022     Lab Results   Component Value Date     03/25/2022    K 4.4 03/25/2022     03/25/2022    CO2 22 03/25/2022    GLU 99 03/25/2022    BUN 15 03/25/2022    CREATININE 0.99 03/25/2022    CALCIUM 9.5 03/25/2022    ALBUMIN 4.4 03/25/2022    BILITOT 0.2 03/25/2022    AST 22 03/25/2022    ALT 22 03/25/2022    EGFRNONAA 91 12/08/2021    TSH 0.153 (L) 10/01/2022     Lab Results   Component Value Date    [id number] 11 (L) 10/01/2022    CALCIUM 9.5 03/25/2022    CALCIUM 9.8 12/08/2021    TSH 0.153 (L) 10/01/2022     Assessment     1. Postablative hypothyroidism  TSH    T4, Free    Thyroid Stimulating Immunoglobulin      2. Schizoaffective disorder, unspecified type        3. Class 2 severe obesity due to excess calories with serious comorbidity and body mass index (BMI) of 38.0 to 38.9 in adult        4. IFG (impaired fasting glucose)  Hemoglobin A1C    Basic Metabolic Panel      5. Primary hypertension        6. TAMIR (generalized anxiety disorder)        7. Vitamin D deficiency  Vitamin D        Plan     Postablative hypothyroidism  - Pt has a known h/o hypothyroidism diagnosed due to  post-surgical in setting of Graves disease as reported  - patient's medications being managed by his mother due to longstanding psychiatric illness  - overall off and on compliance due to missed doses on the weekend  - Last TSH:  Chart review showed elevation in TSH noted, in (Care Everywhere section)  - Clinically unable to evaluate symptoms of hypothyroidism due to psychiatric illness  - Weight based dose is dosage (1.6 mcg/kg = 161  mcg)    Plan  - long discussion with mother about proper administration and direction on levothyroxine, she is aware  Advised to not skip doses if possible  - continue for now levothyroxine 175 mcg 6 days a week.  Skipping Sunday.  - we will repeat TFTs tomorrow to re-evaluate thyroid function before making further adjustment   - close follow-up in 4-5 months,  pending results above    Schizoaffective disorder  - On multiple medication at this time, mood is stable, some can affect thyroid function    Class 2 severe obesity due to excess calories with serious comorbidity and body mass index (BMI) of 38.0 to 38.9 in adult  - Body mass index is 38.07 kg/m².  - with prediabetes, managed by PCP    Vitamin D deficiency  - Lab work reviewed, and discussed with patient in clinic  - Currently not on Vit D supplement  - check lab work    IFG (impaired fasting glucose)  - compare fasting glucose   -on metformin per PCP    Tachycardia  - recommend increase dose of propranolol.      RTC in 5 months, in clinic    Pranav Barr M.D.  Endocrinology  [facility]  2/14/2023      Disclaimer: This note has been generated in part with the use of voice-recognition software. There may be typographical errors that have been missed during proof-reading.

## 2023-02-14 NOTE — ASSESSMENT & PLAN NOTE
- Lab work reviewed, and discussed with patient in clinic  - Currently not on Vit D supplement  - check lab work

## 2023-02-15 ENCOUNTER — LAB VISIT (OUTPATIENT)
Dept: LAB | Facility: HOSPITAL | Age: 23
End: 2023-02-15
Attending: HOSPITALIST
Payer: COMMERCIAL

## 2023-02-15 DIAGNOSIS — E55.9 VITAMIN D DEFICIENCY: ICD-10-CM

## 2023-02-15 DIAGNOSIS — E89.0 POSTABLATIVE HYPOTHYROIDISM: ICD-10-CM

## 2023-02-15 DIAGNOSIS — R73.01 IFG (IMPAIRED FASTING GLUCOSE): ICD-10-CM

## 2023-02-15 LAB
25(OH)D3+25(OH)D2 SERPL-MCNC: 73 NG/ML (ref 30–96)
ANION GAP SERPL CALC-SCNC: 9 MMOL/L (ref 8–16)
BUN SERPL-MCNC: 11 MG/DL (ref 6–20)
CALCIUM SERPL-MCNC: 9.6 MG/DL (ref 8.7–10.5)
CHLORIDE SERPL-SCNC: 107 MMOL/L (ref 95–110)
CO2 SERPL-SCNC: 27 MMOL/L (ref 23–29)
CREAT SERPL-MCNC: 1.3 MG/DL (ref 0.5–1.4)
EST. GFR  (NO RACE VARIABLE): >60 ML/MIN/1.73 M^2
ESTIMATED AVG GLUCOSE: 114 MG/DL (ref 68–131)
GLUCOSE SERPL-MCNC: 130 MG/DL (ref 70–110)
HBA1C MFR BLD: 5.6 % (ref 4–5.6)
POTASSIUM SERPL-SCNC: 3.7 MMOL/L (ref 3.5–5.1)
SODIUM SERPL-SCNC: 143 MMOL/L (ref 136–145)
T4 FREE SERPL-MCNC: 0.91 NG/DL (ref 0.71–1.51)
TSH SERPL DL<=0.005 MIU/L-ACNC: 2.54 UIU/ML (ref 0.4–4)

## 2023-02-15 PROCEDURE — 84439 ASSAY OF FREE THYROXINE: CPT | Performed by: HOSPITALIST

## 2023-02-15 PROCEDURE — 83036 HEMOGLOBIN GLYCOSYLATED A1C: CPT | Performed by: HOSPITALIST

## 2023-02-15 PROCEDURE — 80048 BASIC METABOLIC PNL TOTAL CA: CPT | Performed by: HOSPITALIST

## 2023-02-15 PROCEDURE — 84445 ASSAY OF TSI GLOBULIN: CPT | Performed by: HOSPITALIST

## 2023-02-15 PROCEDURE — 82306 VITAMIN D 25 HYDROXY: CPT | Performed by: HOSPITALIST

## 2023-02-15 PROCEDURE — 84443 ASSAY THYROID STIM HORMONE: CPT | Performed by: HOSPITALIST

## 2023-02-17 LAB — TSI SER-ACNC: <0.1 IU/L

## 2023-06-12 ENCOUNTER — TELEPHONE (OUTPATIENT)
Dept: FAMILY MEDICINE | Facility: CLINIC | Age: 23
End: 2023-06-12
Payer: COMMERCIAL

## 2023-07-05 ENCOUNTER — PATIENT MESSAGE (OUTPATIENT)
Dept: FAMILY MEDICINE | Facility: CLINIC | Age: 23
End: 2023-07-05
Payer: COMMERCIAL

## 2023-07-12 ENCOUNTER — PATIENT MESSAGE (OUTPATIENT)
Dept: FAMILY MEDICINE | Facility: CLINIC | Age: 23
End: 2023-07-12
Payer: COMMERCIAL

## 2023-07-12 DIAGNOSIS — E78.1 HYPERTRIGLYCERIDEMIA: ICD-10-CM

## 2023-07-12 NOTE — TELEPHONE ENCOUNTER
Care Due:                  Date            Visit Type   Department     Provider  --------------------------------------------------------------------------------                                [facility]                              PRIMARY      MED/ INTERNAL  Last Visit: 10-      CARE ([facility])   MED/ PEDS      Ivonne Encarnacion                              [facility]      MED/ INTERNAL  Next Visit: 12-      CARE ([facility])   MED/ PEDS      Ivonne Encarnacion                                                            Last  Test          Frequency    Reason                     Performed    Due Date  --------------------------------------------------------------------------------    Office Visit  12 months..  metFORMIN, pravastatin...  10-   10-    CMP.........  12 months..  pravastatin..............  03- 03-    HBA1C.......  6 months...  metFORMIN................  02-   08-    Lipid Panel.  12 months..  pravastatin..............  10-   09-    [facility] Embedded Care Due Messages. Reference number: [id number].   7/12/2023 4:40:53 PM CDT

## 2023-07-13 RX ORDER — PRAVASTATIN SODIUM 20 MG/1
TABLET ORAL
Qty: 90 TABLET | Refills: 10 | Status: SHIPPED | OUTPATIENT
Start: 2023-07-13

## 2023-07-13 NOTE — TELEPHONE ENCOUNTER
Refill Routing Note   Medication(s) are not appropriate for processing by [facility] for the following reason(s):      Required labs outdated    [facility] action(s):  Defer Labs due   Medication Therapy Plan: FOV 12/19/23      Appointments  past 12m or future 3m with PCP    Date Provider   Last Visit   10/6/2022 Ivonne Encarnacion, DO   Next Visit   12/19/2023 Ivonne Encarnacion, DO   ED visits in past 90 days: 0        Note composed:11:14 PM 07/12/2023

## 2023-08-08 ENCOUNTER — OFFICE VISIT (OUTPATIENT)
Dept: FAMILY MEDICINE | Facility: CLINIC | Age: 23
End: 2023-08-08
Payer: COMMERCIAL

## 2023-08-08 VITALS
TEMPERATURE: 98 F | HEART RATE: 105 BPM | BODY MASS INDEX: 38.82 KG/M2 | WEIGHT: 227.38 LBS | HEIGHT: 64 IN | OXYGEN SATURATION: 96 % | DIASTOLIC BLOOD PRESSURE: 74 MMHG | SYSTOLIC BLOOD PRESSURE: 104 MMHG

## 2023-08-08 DIAGNOSIS — E55.9 HYPOVITAMINOSIS D: ICD-10-CM

## 2023-08-08 DIAGNOSIS — Z00.00 ANNUAL PHYSICAL EXAM: Primary | ICD-10-CM

## 2023-08-08 DIAGNOSIS — R73.03 PRE-DIABETES: ICD-10-CM

## 2023-08-08 DIAGNOSIS — F84.0 AUTISM: ICD-10-CM

## 2023-08-08 DIAGNOSIS — E66.01 CLASS 2 SEVERE OBESITY DUE TO EXCESS CALORIES WITH SERIOUS COMORBIDITY AND BODY MASS INDEX (BMI) OF 39.0 TO 39.9 IN ADULT: ICD-10-CM

## 2023-08-08 DIAGNOSIS — R06.83 SNORING: ICD-10-CM

## 2023-08-08 DIAGNOSIS — E05.00 GRAVES DISEASE: ICD-10-CM

## 2023-08-08 DIAGNOSIS — F20.9 SCHIZOPHRENIA, UNSPECIFIED TYPE: ICD-10-CM

## 2023-08-08 DIAGNOSIS — R40.0 DAYTIME SOMNOLENCE: ICD-10-CM

## 2023-08-08 DIAGNOSIS — E78.5 DYSLIPIDEMIA: ICD-10-CM

## 2023-08-08 DIAGNOSIS — F41.1 GAD (GENERALIZED ANXIETY DISORDER): ICD-10-CM

## 2023-08-08 PROCEDURE — 1159F PR MEDICATION LIST DOCUMENTED IN MEDICAL RECORD: ICD-10-PCS | Mod: CPTII,S$GLB,, | Performed by: FAMILY MEDICINE

## 2023-08-08 PROCEDURE — 1160F PR REVIEW ALL MEDS BY PRESCRIBER/CLIN PHARMACIST DOCUMENTED: ICD-10-PCS | Mod: CPTII,S$GLB,, | Performed by: FAMILY MEDICINE

## 2023-08-08 PROCEDURE — 99999 PR PBB SHADOW E&M-EST. PATIENT-LVL V: CPT | Mod: PBBFAC,,, | Performed by: FAMILY MEDICINE

## 2023-08-08 PROCEDURE — 1159F MED LIST DOCD IN RCRD: CPT | Mod: CPTII,S$GLB,, | Performed by: FAMILY MEDICINE

## 2023-08-08 PROCEDURE — 3078F DIAST BP <80 MM HG: CPT | Mod: CPTII,S$GLB,, | Performed by: FAMILY MEDICINE

## 2023-08-08 PROCEDURE — 3008F PR BODY MASS INDEX (BMI) DOCUMENTED: ICD-10-PCS | Mod: CPTII,S$GLB,, | Performed by: FAMILY MEDICINE

## 2023-08-08 PROCEDURE — 99395 PR PREVENTIVE VISIT,EST,18-39: ICD-10-PCS | Mod: S$GLB,,, | Performed by: FAMILY MEDICINE

## 2023-08-08 PROCEDURE — 3074F SYST BP LT 130 MM HG: CPT | Mod: CPTII,S$GLB,, | Performed by: FAMILY MEDICINE

## 2023-08-08 PROCEDURE — 99999 PR PBB SHADOW E&M-EST. PATIENT-LVL V: ICD-10-PCS | Mod: PBBFAC,,, | Performed by: FAMILY MEDICINE

## 2023-08-08 PROCEDURE — 99214 OFFICE O/P EST MOD 30 MIN: CPT | Mod: 25,S$GLB,, | Performed by: FAMILY MEDICINE

## 2023-08-08 PROCEDURE — 3008F BODY MASS INDEX DOCD: CPT | Mod: CPTII,S$GLB,, | Performed by: FAMILY MEDICINE

## 2023-08-08 PROCEDURE — 1160F RVW MEDS BY RX/DR IN RCRD: CPT | Mod: CPTII,S$GLB,, | Performed by: FAMILY MEDICINE

## 2023-08-08 PROCEDURE — 3074F PR MOST RECENT SYSTOLIC BLOOD PRESSURE < 130 MM HG: ICD-10-PCS | Mod: CPTII,S$GLB,, | Performed by: FAMILY MEDICINE

## 2023-08-08 PROCEDURE — 3078F PR MOST RECENT DIASTOLIC BLOOD PRESSURE < 80 MM HG: ICD-10-PCS | Mod: CPTII,S$GLB,, | Performed by: FAMILY MEDICINE

## 2023-08-08 PROCEDURE — 99395 PREV VISIT EST AGE 18-39: CPT | Mod: S$GLB,,, | Performed by: FAMILY MEDICINE

## 2023-08-08 PROCEDURE — 3044F PR MOST RECENT HEMOGLOBIN A1C LEVEL <7.0%: ICD-10-PCS | Mod: CPTII,S$GLB,, | Performed by: FAMILY MEDICINE

## 2023-08-08 PROCEDURE — 3044F HG A1C LEVEL LT 7.0%: CPT | Mod: CPTII,S$GLB,, | Performed by: FAMILY MEDICINE

## 2023-08-08 PROCEDURE — 99214 PR OFFICE/OUTPT VISIT, EST, LEVL IV, 30-39 MIN: ICD-10-PCS | Mod: 25,S$GLB,, | Performed by: FAMILY MEDICINE

## 2023-08-08 RX ORDER — ESCITALOPRAM OXALATE 10 MG/1
15 TABLET ORAL DAILY
COMMUNITY

## 2023-08-08 RX ORDER — RISPERIDONE 2 MG/1
2 TABLET ORAL 2 TIMES DAILY
COMMUNITY
Start: 2023-07-06 | End: 2024-03-08

## 2023-08-08 RX ORDER — RISPERIDONE 1 MG/1
1 TABLET ORAL 2 TIMES DAILY
COMMUNITY
Start: 2023-03-16 | End: 2024-03-08

## 2023-08-08 NOTE — PROGRESS NOTES
Assessment & Plan:    Annual physical exam    Snoring  -     Ambulatory referral/consult to Sleep Disorders; Future; Expected date: 08/15/2023    Daytime somnolence  -     Ambulatory referral/consult to Sleep Disorders; Future; Expected date: 08/15/2023    Recommended referral to Sleep Medicine for evaluation for suspected DAVID.    Class 2 severe obesity due to excess calories with serious comorbidity and body mass index (BMI) of 39.0 to 39.9 in adult  Pre-diabetes  Dyslipidemia  Patient's mother to hire another PCA to help improve patient's diet and encourage him to be more physically active. May consider a trial of D/C metformin once patient is keeping a healthier lifestyle in the future.    Graves disease  Hypovitaminosis D  Patient's mother states that he just had labs done a few months ago. She will track down his results and forward them to our office.    Schizophrenia, unspecified type  Autism  TAMIR (generalized anxiety disorder)  Management per Psychiatry.      Follow-up: Follow up in about 6 months (around 2/8/2024).  ______________________________________________________________________    Chief Complaint  Chief Complaint   Patient presents with    Annual Exam       HPI  Francisca Rm is a 22 y.o. male with medical diagnoses as listed in the medical history and problem list that presents to the office with his mother for an annual exam. He states that he does not like taking his medications. His mother asks if he might qualify for weight loss injections but patient prefers to take the pills. Patient's mother states that she plans to hire another PCA who can help him follow a healthier diet and get more active. She plans to discuss risperidone injections with his Psychiatrist. His mother reports concerns about snoring and daytime somnolence. Patient endorses non-restorative sleep. She acknowledges that his psychiatric medications may be contributing to his fatigue.     Health Maintenance         Date  Due Completion Date    Influenza Vaccine (1) 09/01/2023 10/6/2022    Hemoglobin A1c (Prediabetes) 05/04/2024 5/4/2023    TETANUS VACCINE 12/07/2031 12/7/2021              PAST MEDICAL HISTORY:  Past Medical History:   Diagnosis Date    Autism spectrum disorder     Bipolar disorder     Hyperlipidemia     Postablative hypothyroidism     Schizophrenia        PAST SURGICAL HISTORY:  History reviewed. No pertinent surgical history.    SOCIAL HISTORY:  Social History     Socioeconomic History    Marital status: Single   Tobacco Use    Smoking status: Every Day     Current packs/day: 0.00    Smokeless tobacco: Never    Tobacco comments:     Smokes 3 - 4 cigars almost daily   Substance and Sexual Activity    Alcohol use: Not Currently    Drug use: Not Currently    Sexual activity: Not Currently       FAMILY HISTORY:  History reviewed. No pertinent family history.    ALLERGIES AND MEDICATIONS: updated and reviewed.  Review of patient's allergies indicates:  No Known Allergies  Current Outpatient Medications   Medication Sig Dispense Refill    amantadine HCl (SYMMETREL) 100 mg capsule       cetirizine (ZYRTEC) 10 MG tablet Take 10 mg by mouth.      cloZAPine (CLOZARIL) 100 MG Tab Take 3 tablets by mouth every morning and 3 tablets every evening.      divalproex (DEPAKOTE) 500 MG TbEC Take by mouth.      EScitalopram oxalate (LEXAPRO) 10 MG tablet Take 15 mg by mouth once daily.      leucovorin (WELLCOVORIN) 25 MG Tab       levOCARNitine (CARNITOR) 330 mg Tab       levothyroxine (SYNTHROID, LEVOTHROID) 175 MCG tablet Take 1 tablet by mouth once daily. Take as a single dose, on an empty stomach with glass of water, one-half to one hour before breakfast 90 tablet 3    metFORMIN (GLUCOPHAGE) 500 MG tablet Take one tablet by mouth every day with breakfast. 90 tablet 0    omega-3 fatty acids-vitamin E 1,000 mg Cap Take 1,000 mg by mouth.      pravastatin (PRAVACHOL) 20 MG tablet TAKE ONE TABLET BY MOUTH ONCE DAILY. 90 tablet 10     "propranoloL (INDERAL) 10 MG tablet Take 2 tablets (20 mg total) by mouth 2 (two) times daily. 120 tablet 11    risperiDONE (RISPERDAL) 1 MG tablet Take 1 mg by mouth 2 (two) times daily.      risperiDONE (RISPERDAL) 2 MG tablet Take 2 mg by mouth 2 (two) times daily.       No current facility-administered medications for this visit.         ROS  Review of Systems   Constitutional:  Positive for fatigue. Negative for activity change, appetite change and unexpected weight change.   Psychiatric/Behavioral:  Negative for dysphoric mood. The patient is not nervous/anxious.            Physical Exam  Vitals:    08/08/23 1523   BP: 104/74   BP Location: Right arm   Patient Position: Sitting   BP Method: Large (Manual)   Pulse: 105   Temp: 98.1 °F (36.7 °C)   TempSrc: Oral   SpO2: 96%   Weight: 103.1 kg (227 lb 6.5 oz)   Height: 5' 4" (1.626 m)    Body mass index is 39.03 kg/m².  Weight: 103.1 kg (227 lb 6.5 oz)   Height: 5' 4" (162.6 cm)   Physical Exam  Constitutional:       General: He is not in acute distress.     Appearance: He is obese.   HENT:      Head: Normocephalic and atraumatic.   Neck:      Thyroid: No thyromegaly.   Cardiovascular:      Rate and Rhythm: Normal rate and regular rhythm.      Pulses: Normal pulses.      Heart sounds: Normal heart sounds.   Pulmonary:      Effort: Pulmonary effort is normal. No respiratory distress.      Breath sounds: Normal breath sounds.   Musculoskeletal:      Cervical back: Neck supple.      Right lower leg: No edema.      Left lower leg: No edema.   Lymphadenopathy:      Cervical: No cervical adenopathy.   Skin:     General: Skin is warm and dry.      Findings: No rash.   Neurological:      Mental Status: He is alert. Mental status is at baseline.   Psychiatric:         Mood and Affect: Affect is flat.         Behavior: Behavior is withdrawn.      Comments: Behavior is at baseline             "

## 2023-08-08 NOTE — PATIENT INSTRUCTIONS
Please send me copies of Brad's most recent blood work.  
Alert and oriented to person, place and time

## 2023-11-27 ENCOUNTER — IMMUNIZATION (OUTPATIENT)
Dept: FAMILY MEDICINE | Facility: CLINIC | Age: 23
End: 2023-11-27
Payer: COMMERCIAL

## 2023-11-27 DIAGNOSIS — Z23 FLU VACCINE NEED: Primary | ICD-10-CM

## 2023-11-27 PROCEDURE — 90471 IMMUNIZATION ADMIN: CPT | Mod: S$GLB,,, | Performed by: FAMILY MEDICINE

## 2023-11-27 PROCEDURE — 90686 IIV4 VACC NO PRSV 0.5 ML IM: CPT | Mod: S$GLB,,, | Performed by: FAMILY MEDICINE

## 2023-11-27 PROCEDURE — 90471 FLU VACCINE (QUAD) GREATER THAN OR EQUAL TO 3YO PRESERVATIVE FREE IM: ICD-10-PCS | Mod: S$GLB,,, | Performed by: FAMILY MEDICINE

## 2023-11-27 PROCEDURE — 90686 FLU VACCINE (QUAD) GREATER THAN OR EQUAL TO 3YO PRESERVATIVE FREE IM: ICD-10-PCS | Mod: S$GLB,,, | Performed by: FAMILY MEDICINE

## 2023-12-07 ENCOUNTER — OFFICE VISIT (OUTPATIENT)
Dept: PULMONOLOGY | Facility: CLINIC | Age: 23
End: 2023-12-07
Payer: COMMERCIAL

## 2023-12-07 VITALS
WEIGHT: 242.5 LBS | BODY MASS INDEX: 41.4 KG/M2 | RESPIRATION RATE: 18 BRPM | DIASTOLIC BLOOD PRESSURE: 98 MMHG | HEART RATE: 110 BPM | OXYGEN SATURATION: 95 % | HEIGHT: 64 IN | SYSTOLIC BLOOD PRESSURE: 140 MMHG

## 2023-12-07 DIAGNOSIS — R40.0 DAYTIME SOMNOLENCE: ICD-10-CM

## 2023-12-07 DIAGNOSIS — E66.01 MORBID OBESITY WITH BMI OF 40.0-44.9, ADULT: Primary | ICD-10-CM

## 2023-12-07 DIAGNOSIS — R06.83 SNORING: ICD-10-CM

## 2023-12-07 PROCEDURE — 3044F PR MOST RECENT HEMOGLOBIN A1C LEVEL <7.0%: ICD-10-PCS | Mod: CPTII,S$GLB,, | Performed by: INTERNAL MEDICINE

## 2023-12-07 PROCEDURE — 3077F SYST BP >= 140 MM HG: CPT | Mod: CPTII,S$GLB,, | Performed by: INTERNAL MEDICINE

## 2023-12-07 PROCEDURE — 99204 OFFICE O/P NEW MOD 45 MIN: CPT | Mod: S$GLB,,, | Performed by: INTERNAL MEDICINE

## 2023-12-07 PROCEDURE — 1159F MED LIST DOCD IN RCRD: CPT | Mod: CPTII,S$GLB,, | Performed by: INTERNAL MEDICINE

## 2023-12-07 PROCEDURE — 3044F HG A1C LEVEL LT 7.0%: CPT | Mod: CPTII,S$GLB,, | Performed by: INTERNAL MEDICINE

## 2023-12-07 PROCEDURE — 1160F PR REVIEW ALL MEDS BY PRESCRIBER/CLIN PHARMACIST DOCUMENTED: ICD-10-PCS | Mod: CPTII,S$GLB,, | Performed by: INTERNAL MEDICINE

## 2023-12-07 PROCEDURE — 3080F PR MOST RECENT DIASTOLIC BLOOD PRESSURE >= 90 MM HG: ICD-10-PCS | Mod: CPTII,S$GLB,, | Performed by: INTERNAL MEDICINE

## 2023-12-07 PROCEDURE — 3008F PR BODY MASS INDEX (BMI) DOCUMENTED: ICD-10-PCS | Mod: CPTII,S$GLB,, | Performed by: INTERNAL MEDICINE

## 2023-12-07 PROCEDURE — 99999 PR PBB SHADOW E&M-EST. PATIENT-LVL III: ICD-10-PCS | Mod: PBBFAC,,, | Performed by: INTERNAL MEDICINE

## 2023-12-07 PROCEDURE — 3008F BODY MASS INDEX DOCD: CPT | Mod: CPTII,S$GLB,, | Performed by: INTERNAL MEDICINE

## 2023-12-07 PROCEDURE — 99999 PR PBB SHADOW E&M-EST. PATIENT-LVL III: CPT | Mod: PBBFAC,,, | Performed by: INTERNAL MEDICINE

## 2023-12-07 PROCEDURE — 3080F DIAST BP >= 90 MM HG: CPT | Mod: CPTII,S$GLB,, | Performed by: INTERNAL MEDICINE

## 2023-12-07 PROCEDURE — 3077F PR MOST RECENT SYSTOLIC BLOOD PRESSURE >= 140 MM HG: ICD-10-PCS | Mod: CPTII,S$GLB,, | Performed by: INTERNAL MEDICINE

## 2023-12-07 PROCEDURE — 1160F RVW MEDS BY RX/DR IN RCRD: CPT | Mod: CPTII,S$GLB,, | Performed by: INTERNAL MEDICINE

## 2023-12-07 PROCEDURE — 1159F PR MEDICATION LIST DOCUMENTED IN MEDICAL RECORD: ICD-10-PCS | Mod: CPTII,S$GLB,, | Performed by: INTERNAL MEDICINE

## 2023-12-07 PROCEDURE — 99204 PR OFFICE/OUTPT VISIT, NEW, LEVL IV, 45-59 MIN: ICD-10-PCS | Mod: S$GLB,,, | Performed by: INTERNAL MEDICINE

## 2023-12-07 NOTE — PROGRESS NOTES
Subjective:   Patient ID: Francisca Rm is a 23 y.o. male    Chief Complaint:   Chief Complaint   Patient presents with    Apnea       Referred by Dr. Ivonne Encarnacion    HPI  Francisca Rm is a 23 y.o. male who was referred by Dr. Ivonne Encarnacion for a sleep evaluation for possible sleep apnea The patient  has a past medical history of Autism spectrum disorder, Bipolar disorder, Hyperlipidemia, Postablative hypothyroidism, and Schizophrenia.  He presents with his mother today. He has BMI of 41.6.  Patient presents with his mother, who works as a NP for [employer].    His mother reports loud snoring that is concerning. Patient has diurnal fatigue and needs to take naps at times. He gets tired during the day especially when he is with his support team that works with him Mondays through Wednesdays.  The patient reports witnessed snoring noticed by others. There is  witnessed apneas. He has  awoken from a snore and has no gasping for air.   Snoring is usually of loud intensity.  When he wakes up from sleep, he does  feel un refreshed.     Weight: Patient reports no change in weight over the last years.    Prior sleep evaluation: none    Sleep medication taken: none.    Other sleep remedies: none      Sleep summary during the workdays per patient report:  Bedtime: 9 - 10 PM  Sleep Latency: variable - a few mins  # Awakenings: 3-4  WASO (wakefulness after sleep onset) a few min  Risetime: 6 -8 AM    Sleep summary during days off per patient report:  Bedtime: 9 - 10 PM  Sleep Latency: variable - a few mins  # Awakenings: 3-4  WASO (wakefulness after sleep onset) a few min  Risetime: 6 -8 AM    Naps are  taken frequently.    Other relevant sleep data:  No other unusual behaviors of sleep noted by his mother      CONTRIBUTING and/or CONFOUNDING FACTORS:    Nocturnal pain:   n    Nocturia >2/night:   n  Heartburn/GI pain:   n  Environment:    n  Bed partner noise/movements : n          STOP BANG  questionnaire:    Snoring present: y  Tiredness present:y  Obstruction (apneas/choking episodes): y  Pressure (HTN): n    BMI greater than 35 kg/m2: y  Age greater than 50 years old: n  Neck circumference > than 17 inches if male or > than 16 inches if female : y  Gender being male: y    Total STOP BANG score = 6/8  Low risk DAVID: 0-2, Intermediate risk DAVID: 3-4, High risk DAVID: 5+         No data to display                   No data to display                Most Recent Vital Signs:    The patient's body mass index is 41.63 kg/m².    Wt Readings from Last 5 Encounters:   12/07/23 110 kg (242 lb 8.1 oz)   08/08/23 103.1 kg (227 lb 6.5 oz)   02/14/23 100.6 kg (221 lb 12.8 oz)   11/15/22 101.6 kg (223 lb 15.8 oz)   10/06/22 100.4 kg (221 lb 7.2 oz)     BMI Readings from Last 5 Encounters:   12/07/23 41.63 kg/m²   08/08/23 39.03 kg/m²   02/14/23 38.07 kg/m²   11/15/22 38.45 kg/m²   10/06/22 38.01 kg/m²     Pulse Readings from Last 3 Encounters:   12/07/23 110   08/08/23 105   02/14/23 (!) 121         Current Outpatient Medications:     cetirizine (ZYRTEC) 10 MG tablet, Take 10 mg by mouth., Disp: , Rfl:     cloZAPine (CLOZARIL) 100 MG Tab, Take 3 tablets by mouth every morning and 3 tablets every evening., Disp: , Rfl:     divalproex (DEPAKOTE) 500 MG TbEC, Take by mouth., Disp: , Rfl:     EScitalopram oxalate (LEXAPRO) 10 MG tablet, Take 15 mg by mouth once daily., Disp: , Rfl:     levothyroxine (SYNTHROID, LEVOTHROID) 175 MCG tablet, Take 1 tablet by mouth once daily. Take as a single dose, on an empty stomach with glass of water, one-half to one hour before breakfast, Disp: 90 tablet, Rfl: 3    metFORMIN (GLUCOPHAGE) 500 MG tablet, Take one tablet by mouth every day with breakfast., Disp: 90 tablet, Rfl: 0    pravastatin (PRAVACHOL) 20 MG tablet, TAKE ONE TABLET BY MOUTH ONCE DAILY., Disp: 90 tablet, Rfl: 10    amantadine HCl (SYMMETREL) 100 mg capsule, , Disp: , Rfl:     leucovorin (WELLCOVORIN) 25 MG Tab, , Disp: ,  "Rfl:     levOCARNitine (CARNITOR) 330 mg Tab, , Disp: , Rfl:     omega-3 fatty acids-vitamin E 1,000 mg Cap, Take 1,000 mg by mouth., Disp: , Rfl:     propranoloL (INDERAL) 10 MG tablet, Take 2 tablets (20 mg total) by mouth 2 (two) times daily., Disp: 120 tablet, Rfl: 11    risperiDONE (RISPERDAL) 1 MG tablet, Take 1 mg by mouth 2 (two) times daily., Disp: , Rfl:     risperiDONE (RISPERDAL) 2 MG tablet, Take 2 mg by mouth 2 (two) times daily., Disp: , Rfl:        Objective:   Vitals reviewed   Physical Exam  Constitutional:       Appearance: Normal appearance.   HENT:      Head: Normocephalic.      Mouth/Throat:      Comments: Mallampati IV  Neck:      Comments: 17.5"  Cardiovascular:      Rate and Rhythm: Normal rate and regular rhythm.   Pulmonary:      Effort: Pulmonary effort is normal. No respiratory distress.      Breath sounds: No wheezing or rales.   Musculoskeletal:      Cervical back: Normal range of motion and neck supple.   Neurological:      General: No focal deficit present.      Mental Status: He is alert and oriented to person, place, and time.   Psychiatric:         Mood and Affect: Mood normal.         Behavior: Behavior normal.         Assessment:     Problem List Items Addressed This Visit    None  Visit Diagnoses       Morbid obesity with BMI of 40.0-44.9, adult    -  Primary    Relevant Orders    Home Sleep Study    Snoring        Relevant Orders    Home Sleep Study    Daytime somnolence        Relevant Orders    Home Sleep Study              Plan:       Comorbid morbid obesity , adhd, autism    Could likely be secondary to sleep disordered breathing of obstructive origin.  Reviewed STOP BANG and ESS.    Patient has been informed about the pathophysiology and prognosis associated with DAVID and CSA and has been advised to undergo a baseline Polysomnography (PSG) study for further evaluation of his sleep disorder breathing.    Patient understood and agreed to undergo for a PSG study. Will order " Home Sleep Study and proceed with further studies or prescription for CPAP as appropriate.    Patient has been advised to loose weight through a diet and exercise plan.    Today's office encounter included review of salient clinical notes from others involved in the care of the patient, discrete laboratory elements and, as available, prior and present intervention for the disturbance of sleep.  The information gleaned was used in establishing the diagnosis and in stratification of disease risk.    The patient has a disturbance of sleep with clinically relevant potential for adverse behavioral, cardiovascular, and metabolic outcomes.  Untreated, the disturbance of sleep can have significant effect on mental health, clinical health and survival.       Additionally close attention to patient's past medical history as described below were discussed today during the office evaluation.   he  has a past medical history of Autism spectrum disorder, Bipolar disorder, Hyperlipidemia, Postablative hypothyroidism, and Schizophrenia.       All patient's questions and concerns regarding Sleep Disorder Breathing were addressed during this visit.    Will follow-up patient with results of PSG

## 2023-12-19 ENCOUNTER — PATIENT MESSAGE (OUTPATIENT)
Dept: SLEEP MEDICINE | Facility: HOSPITAL | Age: 23
End: 2023-12-19
Payer: COMMERCIAL

## 2024-01-05 ENCOUNTER — HOSPITAL ENCOUNTER (OUTPATIENT)
Dept: SLEEP MEDICINE | Facility: HOSPITAL | Age: 24
Discharge: HOME | End: 2024-01-05
Attending: INTERNAL MEDICINE
Payer: MEDICARE

## 2024-01-05 DIAGNOSIS — G47.33 OSA (OBSTRUCTIVE SLEEP APNEA): Primary | ICD-10-CM

## 2024-01-05 DIAGNOSIS — R40.0 DAYTIME SOMNOLENCE: ICD-10-CM

## 2024-01-05 DIAGNOSIS — E66.01 MORBID OBESITY WITH BMI OF 40.0-44.9, ADULT: ICD-10-CM

## 2024-01-05 DIAGNOSIS — R06.83 SNORING: ICD-10-CM

## 2024-01-05 PROCEDURE — 95800 SLP STDY UNATTENDED: CPT

## 2024-01-08 ENCOUNTER — OFFICE VISIT (OUTPATIENT)
Dept: ENDOCRINOLOGY | Facility: CLINIC | Age: 24
End: 2024-01-08
Payer: COMMERCIAL

## 2024-01-08 DIAGNOSIS — E55.9 VITAMIN D DEFICIENCY: ICD-10-CM

## 2024-01-08 DIAGNOSIS — R73.01 IFG (IMPAIRED FASTING GLUCOSE): ICD-10-CM

## 2024-01-08 DIAGNOSIS — E89.0 POSTABLATIVE HYPOTHYROIDISM: Primary | ICD-10-CM

## 2024-01-08 DIAGNOSIS — F25.0 SCHIZOAFFECTIVE DISORDER, BIPOLAR TYPE: Chronic | ICD-10-CM

## 2024-01-08 DIAGNOSIS — E66.01 CLASS 2 SEVERE OBESITY DUE TO EXCESS CALORIES WITH SERIOUS COMORBIDITY AND BODY MASS INDEX (BMI) OF 38.0 TO 38.9 IN ADULT: ICD-10-CM

## 2024-01-08 PROBLEM — R06.83 SNORING: Status: ACTIVE | Noted: 2024-01-08

## 2024-01-08 PROCEDURE — 99214 OFFICE O/P EST MOD 30 MIN: CPT | Mod: 95,,, | Performed by: HOSPITALIST

## 2024-01-08 NOTE — ASSESSMENT & PLAN NOTE
- Lab work reviewed, and discussed with patient in clinic  - Currently on Vit D supplement  - check lab work

## 2024-01-08 NOTE — PROGRESS NOTES
The patient location is: [place], [place]  The chief complaint leading to consultation is:  Hypothyroidism  Visit type: Virtual visit with synchronous audio and video  Total time spent with patient:  30 minute  Each patient to whom he or she provides medical services by telemedicine is:  (1) informed of the relationship between the physician and patient and the respective role of any other health care provider with respect to management of the patient; and (2) notified that he or she may decline to receive medical services by telemedicine and may withdraw from such care at any time.    Subjective:      Patient ID: Francisca Rm is a 23 y.o. male presented to [facility] on 1/8/2024.  Chief Complaint:  No chief complaint on file.    History of Present Illness: Francisca Rm is a 23 y.o. male here for  postablative hypothyroidism due to Graves disease  Other significant past medical history:  Schizoaffective disorder, obesity, tobacco user  Mother provided main history    Interval history:  Virtual visit due to weather, seen patient with his mother.  Who is the primary historian.    Per patient Will has been doing well with his mood since being started on Invega sustenna  Weight gain noted.  Weight gain: 242 lb most recent office visit 12/2020, previous weight 221 (2/2023)  No behavior problem.  No sign of hyperthyroidism   A1c is going up per mother on metformin  Has primary care doctor appointment on 2/15/2024  Will is taking levothyroxine 6 days a week skipping Sunday.  Taking as directed per mother      1) Postablative hypothyroidism:   - 2017 had Graves disease>> Had VASQUEZ ablation in 2017  - Treated at [facility] here in town  - Had psych issue at that time which masked his hyperthyroidism symptoms  - Family history thyroid disorder: maternal aunt  - Thyroid goiter: no  - Previous US/FNA: no  - Current medication: Generic levothyroxine 175 mcg 6 days a week, skipping  Sunday   >> mom gives him his medication> does not miss doses>>Take 30 min before any other medications     US thyroid   9/2017  THYROID ULTRASOUND: The thyroid gland appears mildly enlarged with right thyroid lobe. measured at 5.4 x 1.3 x 2.2 cm with thyroid volume estimated at 7.3 mL's. The left thyroid lobe is 4.9 x 1.1 x 1.8 cm with left thyroid volume estimated at 4.7 mL's. There is mild diffuse heterogeneity without obvious nodule or cyst. There is no significant increased blood flow or additional abnormality. There is no significant adjacent adenopathy.     IMPRESSION: MILD DIFFUSE HETEROGENEITY AND ENLARGEMENT WITHOUT FOCAL DISEASE    Due to cognition, he does not report symptoms  Mom with noticed that patient would get more agitated or increase in diaphoresis on occasion>> if there are thyroid issue  No increases in weight recently.  No changes in mental status or behavior    Lab Results   Component Value Date    TSH 2.535 02/15/2023    TSH 0.153 (L) 10/01/2022    TSH 0.18 (L) 03/25/2020    FREET4 0.91 02/15/2023    FREET4 1.02 10/01/2022    THYROIDSTIMI <0.10 02/15/2023         2) Vit D Def  - Vit D3 [id number] 5 days a week    Lab Results   Component Value Date    LGTUDQLZ46ZW 73 02/15/2023    RGKXSHCX37QM 11 (L) 10/01/2022    CALCIUM 9.6 02/15/2023    CALCIUM 9.5 03/25/2022    CALCIUM 9.8 12/08/2021    TSH 2.535 02/15/2023      3) prediabetes  - per patient's mother on metformin daily   - started by PCP    Lab Results   Component Value Date    HGBA1C 5.6 02/15/2023       Reviewed past surgical, medical, family, social history and updated as appropriate.  Review of Systems: see HPI above    Objective:   There were no vitals taken for this visit.    There is no height or weight on file to calculate BMI.  Vital signs reviewed    Physical Exam  Vitals and nursing note reviewed.   Constitutional:       Appearance: Normal appearance. He is well-developed. He is obese. He is not ill-appearing.   Neck:       Thyroid: No thyromegaly.   Pulmonary:      Effort: Pulmonary effort is normal. No respiratory distress.   Musculoskeletal:         General: Normal range of motion.      Cervical back: Normal range of motion.   Neurological:      General: No focal deficit present.      Mental Status: He is alert. Mental status is at baseline.   Psychiatric:         Mood and Affect: Mood normal.         Behavior: Behavior normal.       Lab Reviewed:   Lab Results   Component Value Date    HGBA1C 5.6 02/15/2023     Lab Results   Component Value Date    CHOL 97 (L) 10/01/2022    HDL 37 (L) 10/01/2022    LDLCALC 45.0 (L) 10/01/2022    TRIG 75 10/01/2022    CHOLHDL 38.1 10/01/2022     Lab Results   Component Value Date     02/15/2023    K 3.7 02/15/2023     02/15/2023    CO2 27 02/15/2023     (H) 02/15/2023    BUN 11 02/15/2023    CREATININE 1.3 02/15/2023    CALCIUM 9.6 02/15/2023    ALBUMIN 4.4 03/25/2022    BILITOT 0.2 03/25/2022    AST 22 03/25/2022    ALT 22 03/25/2022    ANIONGAP 9 02/15/2023    ESTGFRAFRICA 98 03/14/2020    EGFRNONAA 91 12/08/2021    TSH 2.535 02/15/2023     Lab Results   Component Value Date    BTXQMIYU01UN 73 02/15/2023    WYDTIVPF16UR 11 (L) 10/01/2022    CALCIUM 9.6 02/15/2023    CALCIUM 9.5 03/25/2022    CALCIUM 9.8 12/08/2021    TSH 2.535 02/15/2023     Assessment     1. Postablative hypothyroidism  TSH    T4, Free    TSH    T4, Free      2. Class 2 severe obesity due to excess calories with serious comorbidity and body mass index (BMI) of 38.0 to 38.9 in adult  HEMOGLOBIN A1C    Basic Metabolic Panel    Vitamin D    HEMOGLOBIN A1C    Basic Metabolic Panel    Vitamin D      3. IFG (impaired fasting glucose)  HEMOGLOBIN A1C    Basic Metabolic Panel    HEMOGLOBIN A1C    Basic Metabolic Panel      4. Schizoaffective disorder, bipolar type        5. Vitamin D deficiency          Plan     Postablative hypothyroidism  - Pt has a known h/o hypothyroidism diagnosed due to  post-surgical in setting  of Graves disease as reported  - patient's medications being managed by his mother due to longstanding psychiatric illness  - overall off and on compliance due to missed doses on the weekend  - Last TSH:  Chart review showed elevation in TSH noted, in (Care Everywhere section)  - Clinically unable to evaluate symptoms of hypothyroidism due to psychiatric illness  - compliance with levothyroxine 175 mcg 6 days a week, skipping Sunday due to previous symptoms hyperthyroidism  - with weight gain reported since being on new psychiatric medication.  - check TSH/free T4 at [facility] this week.  New order sent   - pending results will make medication adjustment at that time.  Mother is aware  - follow-up in 6 months    Schizoaffective disorder  - On multiple medication at this time, mood is stable  - some can affect weight gain therefore affect thyroid function    IFG (impaired fasting glucose)  - check A1c, advised patient to follow-up with PCP to discuss monitoring    Vitamin D deficiency  - Lab work reviewed, and discussed with patient in clinic  - Currently on Vit D supplement  - check lab work    Obesity  - due to new psychiatric medication   -agree with consideration a GLP1    RTC in 6 months    Pranav Barr M.D.  Endocrinology  [facility]  1/8/2024      Disclaimer: This note has been generated in part with the use of voice-recognition software. There may be typographical errors that have been missed during proof-reading.

## 2024-01-08 NOTE — ASSESSMENT & PLAN NOTE
- Pt has a known h/o hypothyroidism diagnosed due to  post-surgical in setting of Graves disease as reported  - patient's medications being managed by his mother due to longstanding psychiatric illness  - overall off and on compliance due to missed doses on the weekend  - Last TSH:  Chart review showed elevation in TSH noted, in (Care Everywhere section)  - Clinically unable to evaluate symptoms of hypothyroidism due to psychiatric illness  - compliance with levothyroxine 175 mcg 6 days a week, skipping Sunday due to previous symptoms hyperthyroidism  - with weight gain reported since being on new psychiatric medication.  - check TSH/free T4 at [facility] this week.  New order sent   - pending results will make medication adjustment at that time.  Mother is aware  - follow-up in 6 months

## 2024-01-08 NOTE — Clinical Note
Follow up in 6 months Please disregard, patient scheduled for next week. Per E-Advice messages.     Thank you!

## 2024-01-08 NOTE — ASSESSMENT & PLAN NOTE
- On multiple medication at this time, mood is stable  - some can affect weight gain therefore affect thyroid function

## 2024-01-12 DIAGNOSIS — F41.1 GAD (GENERALIZED ANXIETY DISORDER): ICD-10-CM

## 2024-01-12 DIAGNOSIS — I10 PRIMARY HYPERTENSION: ICD-10-CM

## 2024-01-12 RX ORDER — PROPRANOLOL HYDROCHLORIDE 10 MG/1
20 TABLET ORAL 2 TIMES DAILY
Qty: 120 TABLET | Refills: 10 | Status: SHIPPED | OUTPATIENT
Start: 2024-01-12

## 2024-01-18 ENCOUNTER — PATIENT MESSAGE (OUTPATIENT)
Dept: PULMONOLOGY | Facility: CLINIC | Age: 24
End: 2024-01-18
Payer: MEDICARE

## 2024-01-18 ENCOUNTER — PATIENT MESSAGE (OUTPATIENT)
Dept: ENDOCRINOLOGY | Facility: CLINIC | Age: 24
End: 2024-01-18
Payer: MEDICARE

## 2024-01-18 DIAGNOSIS — F99 PSYCHIATRIC DIAGNOSIS: ICD-10-CM

## 2024-01-18 DIAGNOSIS — E66.01 CLASS 2 SEVERE OBESITY DUE TO EXCESS CALORIES WITH SERIOUS COMORBIDITY AND BODY MASS INDEX (BMI) OF 38.0 TO 38.9 IN ADULT: ICD-10-CM

## 2024-01-18 DIAGNOSIS — G47.33 OSA (OBSTRUCTIVE SLEEP APNEA): Primary | ICD-10-CM

## 2024-01-18 PROCEDURE — 95806 SLEEP STUDY UNATT&RESP EFFT: CPT | Mod: 26,,, | Performed by: INTERNAL MEDICINE

## 2024-01-24 ENCOUNTER — PATIENT MESSAGE (OUTPATIENT)
Dept: ENDOCRINOLOGY | Facility: CLINIC | Age: 24
End: 2024-01-24
Payer: MEDICARE

## 2024-01-24 DIAGNOSIS — E89.0 POSTABLATIVE HYPOTHYROIDISM: Primary | ICD-10-CM

## 2024-01-25 LAB
25(OH)D3+25(OH)D2 SERPL-MCNC: 24.9 NG/ML (ref 30–100)
BUN SERPL-MCNC: 13 MG/DL (ref 6–20)
BUN/CREAT SERPL: 12 (ref 9–20)
CALCIUM SERPL-MCNC: 9.8 MG/DL (ref 8.7–10.2)
CHLORIDE SERPL-SCNC: 106 MMOL/L (ref 96–106)
CO2 SERPL-SCNC: 23 MMOL/L (ref 20–29)
CREAT SERPL-MCNC: 1.11 MG/DL (ref 0.76–1.27)
EST. GFR  (NO RACE VARIABLE): 96 ML/MIN/1.73
GLUCOSE SERPL-MCNC: 152 MG/DL (ref 70–99)
HBA1C MFR BLD: 6.1 % (ref 4.8–5.6)
POTASSIUM SERPL-SCNC: 3.8 MMOL/L (ref 3.5–5.2)
SODIUM SERPL-SCNC: 145 MMOL/L (ref 134–144)
T4 FREE SERPL-MCNC: 1.88 NG/DL (ref 0.82–1.77)
TSH SERPL DL<=0.005 MIU/L-ACNC: 0.2 UIU/ML (ref 0.45–4.5)

## 2024-01-26 ENCOUNTER — HOSPITAL ENCOUNTER (OUTPATIENT)
Dept: SLEEP MEDICINE | Facility: HOSPITAL | Age: 24
Discharge: HOME | End: 2024-01-26
Attending: INTERNAL MEDICINE
Payer: COMMERCIAL

## 2024-01-26 DIAGNOSIS — E66.01 CLASS 2 SEVERE OBESITY DUE TO EXCESS CALORIES WITH SERIOUS COMORBIDITY AND BODY MASS INDEX (BMI) OF 38.0 TO 38.9 IN ADULT: ICD-10-CM

## 2024-01-26 DIAGNOSIS — G47.33 OSA (OBSTRUCTIVE SLEEP APNEA): ICD-10-CM

## 2024-01-26 DIAGNOSIS — F99 PSYCHIATRIC DIAGNOSIS: ICD-10-CM

## 2024-01-26 PROCEDURE — 95811 POLYSOM 6/>YRS CPAP 4/> PARM: CPT

## 2024-01-27 NOTE — PROGRESS NOTES
A Cpap titration was performed on Francisca Lind. The entire procedure was explained, including Bio calibration procedure, patient was informed that there may be a need to enter the room during the night to fix lead or make adjustments to the equipment. Questions were answered prior to start of study. He  was given instructions on how to call out for help including how to use the nurse call unit in the bathroom. Patient was given [facility] phone number to call if patient needed tech for anything, in case tech was out of tech room.  Patient education was performed. This includes the possible use of CPAP machine and different CPAP masks. He  was given the after visit summary.   The patient hookup was done by student Speed  The lowest oxygen saturation observed during sleep was 92%  The patients mother stayed with the patient  The EKG appeared to be NSR  The patient was titrated from 5 cm H2O to 14 Cm H2O Eson nasal mask medium was used with chin strap, humidity, and EPR.. The pressure was lowered to see if it would help with cental events. The pressure of   10 cm H2O  is believed to eliminate most of the events, some events still persists. The patient was asked to lay supine twice but turned to his side for comfort before going into REM Supine.   His reaction to PAP was it was alright.

## 2024-01-31 RX ORDER — LEVOTHYROXINE SODIUM 137 UG/1
137 TABLET ORAL
Qty: 90 TABLET | Refills: 3 | Status: SHIPPED | OUTPATIENT
Start: 2024-01-31 | End: 2024-03-08 | Stop reason: DRUGHIGH

## 2024-02-03 ENCOUNTER — PATIENT MESSAGE (OUTPATIENT)
Dept: PULMONOLOGY | Facility: CLINIC | Age: 24
End: 2024-02-03
Payer: MEDICARE

## 2024-02-03 DIAGNOSIS — G47.33 OSA (OBSTRUCTIVE SLEEP APNEA): Primary | ICD-10-CM

## 2024-02-07 PROCEDURE — 95811 POLYSOM 6/>YRS CPAP 4/> PARM: CPT | Mod: 26,,, | Performed by: INTERNAL MEDICINE

## 2024-02-15 ENCOUNTER — PATIENT MESSAGE (OUTPATIENT)
Dept: FAMILY MEDICINE | Facility: CLINIC | Age: 24
End: 2024-02-15
Payer: MEDICARE

## 2024-02-27 ENCOUNTER — PATIENT MESSAGE (OUTPATIENT)
Dept: FAMILY MEDICINE | Facility: CLINIC | Age: 24
End: 2024-02-27
Payer: MEDICARE

## 2024-03-08 ENCOUNTER — OFFICE VISIT (OUTPATIENT)
Dept: FAMILY MEDICINE | Facility: CLINIC | Age: 24
End: 2024-03-08
Payer: MEDICARE

## 2024-03-08 VITALS
WEIGHT: 248.88 LBS | HEIGHT: 64 IN | OXYGEN SATURATION: 98 % | HEART RATE: 107 BPM | BODY MASS INDEX: 42.49 KG/M2 | DIASTOLIC BLOOD PRESSURE: 80 MMHG | TEMPERATURE: 98 F | SYSTOLIC BLOOD PRESSURE: 128 MMHG

## 2024-03-08 DIAGNOSIS — E78.5 DYSLIPIDEMIA: ICD-10-CM

## 2024-03-08 DIAGNOSIS — G47.33 OSA (OBSTRUCTIVE SLEEP APNEA): ICD-10-CM

## 2024-03-08 DIAGNOSIS — Z72.0 TOBACCO ABUSE: ICD-10-CM

## 2024-03-08 DIAGNOSIS — R73.03 PRE-DIABETES: ICD-10-CM

## 2024-03-08 DIAGNOSIS — Z01.00 ROUTINE EYE EXAM: ICD-10-CM

## 2024-03-08 DIAGNOSIS — E66.01 CLASS 3 SEVERE OBESITY DUE TO EXCESS CALORIES WITH SERIOUS COMORBIDITY AND BODY MASS INDEX (BMI) OF 40.0 TO 44.9 IN ADULT: ICD-10-CM

## 2024-03-08 DIAGNOSIS — Z00.00 ANNUAL PHYSICAL EXAM: Primary | ICD-10-CM

## 2024-03-08 DIAGNOSIS — E89.0 POSTABLATIVE HYPOTHYROIDISM: ICD-10-CM

## 2024-03-08 DIAGNOSIS — I15.8 OTHER SECONDARY HYPERTENSION: ICD-10-CM

## 2024-03-08 DIAGNOSIS — E55.9 HYPOVITAMINOSIS D: ICD-10-CM

## 2024-03-08 DIAGNOSIS — F90.9 ATTENTION DEFICIT HYPERACTIVITY DISORDER (ADHD), UNSPECIFIED ADHD TYPE: ICD-10-CM

## 2024-03-08 DIAGNOSIS — F84.0 AUTISM: ICD-10-CM

## 2024-03-08 DIAGNOSIS — F20.9 SCHIZOPHRENIA, UNSPECIFIED TYPE: ICD-10-CM

## 2024-03-08 PROCEDURE — 99999 PR PBB SHADOW E&M-EST. PATIENT-LVL IV: CPT | Mod: PBBFAC,,, | Performed by: FAMILY MEDICINE

## 2024-03-08 PROCEDURE — 99395 PREV VISIT EST AGE 18-39: CPT | Mod: S$GLB,,, | Performed by: FAMILY MEDICINE

## 2024-03-08 RX ORDER — METFORMIN HYDROCHLORIDE 500 MG/1
500 TABLET ORAL 2 TIMES DAILY WITH MEALS
COMMUNITY
End: 2024-05-27 | Stop reason: SDUPTHER

## 2024-03-08 RX ORDER — LEVOTHYROXINE SODIUM 175 UG/1
175 TABLET ORAL
COMMUNITY
End: 2024-04-05

## 2024-03-08 NOTE — PROGRESS NOTES
Assessment & Plan:    Annual physical exam    Class 3 severe obesity due to excess calories with serious comorbidity and body mass index (BMI) of 40.0 to 44.9 in adult  Pre-diabetes  -     Hemoglobin A1C; Future; Expected date: 03/08/2024    Patient was counseled to avoid sweets and over-consumption of carbohydrates.   Continue regular physical activity.   Due for repeat A1c in July.    Postablative hypothyroidism  Synthroid recently changed to 175 mcg daily and skip Sundays. Repeat thyroid studies ordered by Endo but not scheduled.  Will schedule labs 1 week before his upcoming Endo appointment.    Dyslipidemia  -     Lipid Panel; Future; Expected date: 03/08/2024    See above.    DAVID (obstructive sleep apnea)  Compliant with CPAP.    Other secondary hypertension  Controlled. Continue current therapy.     Hypovitaminosis D  -     Vitamin D; Future; Expected date: 03/08/2024    Taking vitamin D supplements.    Autism  Attention deficit hyperactivity disorder (ADHD), unspecified ADHD type  Schizophrenia, unspecified type  Continue therapy plan per Psychiatry.    Tobacco abuse  Encouraged cessation. Patient is not ready to quit.     Routine eye exam   Referral to Optometry signed.    Health maintenance reviewed & addressed above.    Follow-up: Follow up in about 6 months (around 9/8/2024).  ______________________________________________________________________    Chief Complaint  Chief Complaint   Patient presents with    Health Maintenance       HPI  Francisca Rm is a 23 y.o. male with medical diagnoses as listed in the medical history and problem list that presents to the office with his mother to follow up on his chronic conditions. Patient just got his learner's permit and has been driving. 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 He has been working out with weights. He has gained some weight since his last visit. Admits to eating a lot of candy and Chinese food. He continues to smoke.     Mother is  requesting referral to a new Optometrist for a routine eye exam.    Most recent pertinent workup:     Last CMP Results  Lab Results   Component Value Date     (H) 01/24/2024    K 3.8 01/24/2024     01/24/2024    CO2 23 01/24/2024    BUN 13 01/24/2024    CREATININE 1.11 01/24/2024    CALCIUM 9.8 01/24/2024    ALBUMIN 4.4 03/25/2022    AST 22 03/25/2022    ALT 22 03/25/2022     Last A1C  Lab Results   Component Value Date    HGBA1C 6.1 (H) 01/24/2024         Health Maintenance         Date Due Completion Date    COVID-19 Vaccine (5 - 2023-24 season) 09/01/2023 10/19/2022    Hemoglobin A1c (Prediabetes) 01/24/2025 1/24/2024    TETANUS VACCINE 12/07/2031 12/7/2021              PAST MEDICAL HISTORY:  Past Medical History:   Diagnosis Date    Autism spectrum disorder     Bipolar disorder     Hyperlipidemia     Postablative hypothyroidism     Schizophrenia        PAST SURGICAL HISTORY:  History reviewed. No pertinent surgical history.    SOCIAL HISTORY:  Social History     Socioeconomic History    Marital status: Single   Tobacco Use    Smoking status: Every Day    Smokeless tobacco: Never    Tobacco comments:     Smokes 3 - 4 cigars almost daily   Substance and Sexual Activity    Alcohol use: Not Currently    Drug use: Not Currently    Sexual activity: Not Currently     Social Determinants of Health     Financial Resource Strain: Low Risk  (1/8/2024)    Overall Financial Resource Strain (CARDIA)     Difficulty of Paying Living Expenses: Not hard at all   Food Insecurity: No Food Insecurity (1/8/2024)    Hunger Vital Sign     Worried About Running Out of Food in the Last Year: Never true     Ran Out of Food in the Last Year: Never true   Transportation Needs: No Transportation Needs (1/8/2024)    PRAPARE - Transportation     Lack of Transportation (Medical): No     Lack of Transportation (Non-Medical): No   Physical Activity: Insufficiently Active (1/8/2024)    Exercise Vital Sign     Days of Exercise per Week:  2 days     Minutes of Exercise per Session: 20 min   Stress: No Stress Concern Present (1/8/2024)    [employer] [place] [employer] - Occupational Stress Questionnaire     Feeling of Stress : Only a little   Social Connections: Unknown (1/8/2024)    Social Connection and Isolation Panel [NHANES]     Frequency of Communication with Friends and Family: Once a week     Frequency of Social Gatherings with Friends and Family: Once a week     Active Member of Clubs or Organizations: Yes     Attends Club or Organization Meetings: More than 4 times per year     Marital Status: Never    Housing Stability: Unknown (1/8/2024)    Housing Stability Vital Sign     Unable to Pay for Housing in the Last Year: No     Unstable Housing in the Last Year: No       FAMILY HISTORY:  History reviewed. No pertinent family history.    ALLERGIES AND MEDICATIONS: updated and reviewed.  Review of patient's allergies indicates:  No Known Allergies  Current Outpatient Medications   Medication Sig Dispense Refill    cloZAPine (CLOZARIL) 100 MG Tab Take 3 tablets by mouth every morning and 3 tablets every evening.      divalproex (DEPAKOTE) 500 MG TbEC Take by mouth.      EScitalopram oxalate (LEXAPRO) 10 MG tablet Take 15 mg by mouth once daily.      pravastatin (PRAVACHOL) 20 MG tablet TAKE ONE TABLET BY MOUTH ONCE DAILY. 90 tablet 10    propranoloL (INDERAL) 10 MG tablet TAKE 2 TABLETS BY MOUTH TWICE A  tablet 10    cetirizine (ZYRTEC) 10 MG tablet Take 10 mg by mouth.      levothyroxine (SYNTHROID) 175 MCG tablet Take 175 mcg by mouth before breakfast.      metFORMIN (GLUCOPHAGE) 500 MG tablet Take 500 mg by mouth 2 (two) times daily with meals.       No current facility-administered medications for this visit.         ROS  Review of Systems   Constitutional:  Positive for activity change and unexpected weight change. Negative for appetite change.   Psychiatric/Behavioral:  Negative for dysphoric mood. The patient is not  "nervous/anxious.            Physical Exam  Vitals:    03/08/24 0958   BP: 128/80   Pulse: 107   Temp: 98.2 °F (36.8 °C)   TempSrc: Oral   SpO2: 98%   Weight: 112.9 kg (248 lb 14.4 oz)   Height: 5' 4" (1.626 m)    Body mass index is 42.72 kg/m².  Weight: 112.9 kg (248 lb 14.4 oz)   Height: 5' 4" (162.6 cm)   Physical Exam  Constitutional:       General: He is not in acute distress.     Appearance: He is obese.   HENT:      Head: Normocephalic and atraumatic.   Neck:      Thyroid: No thyromegaly.   Cardiovascular:      Rate and Rhythm: Regular rhythm. Tachycardia present.      Pulses: Normal pulses.      Heart sounds: Normal heart sounds.   Pulmonary:      Effort: Pulmonary effort is normal. No respiratory distress.      Breath sounds: Normal breath sounds.   Musculoskeletal:      Cervical back: Neck supple.   Lymphadenopathy:      Cervical: No cervical adenopathy.   Skin:     General: Skin is warm and dry.      Findings: No rash.   Neurological:      Mental Status: He is alert. Mental status is at baseline.   Psychiatric:         Mood and Affect: Mood normal.             "

## 2024-03-12 ENCOUNTER — OFFICE VISIT (OUTPATIENT)
Dept: OPTOMETRY | Facility: CLINIC | Age: 24
End: 2024-03-12
Payer: MEDICARE

## 2024-03-12 DIAGNOSIS — Z01.00 ROUTINE EYE EXAM: Primary | ICD-10-CM

## 2024-03-12 PROCEDURE — 99999 PR PBB SHADOW E&M-EST. PATIENT-LVL II: CPT | Mod: PBBFAC,,, | Performed by: OPTOMETRIST

## 2024-03-12 PROCEDURE — 92004 COMPRE OPH EXAM NEW PT 1/>: CPT | Mod: S$GLB,,, | Performed by: OPTOMETRIST

## 2024-03-12 NOTE — PROGRESS NOTES
Subjective:       Patient ID: Francisca Rm is a 23 y.o. male      Chief Complaint   Patient presents with    Concerns About Ocular Health     History of Present Illness  Dls: 1 yr     24 y/o male presents today for ocular health check.  Pt wears single vision PRN.     No tearing  No itching   No burning  No pain  + ha's  No floaters  No flashes    Eye meds  none    pohx:   None    Fohx:   None         Assessment/Plan:     1. Routine eye exam  Good ocular health OU  Declined MRx.     - Ambulatory referral/consult to Optometry    Follow up in about 2 years (around 3/12/2026).

## 2024-03-25 ENCOUNTER — PATIENT MESSAGE (OUTPATIENT)
Dept: PULMONOLOGY | Facility: CLINIC | Age: 24
End: 2024-03-25
Payer: MEDICARE

## 2024-04-05 DIAGNOSIS — E89.0 POSTABLATIVE HYPOTHYROIDISM: Primary | ICD-10-CM

## 2024-04-05 RX ORDER — LEVOTHYROXINE SODIUM 175 UG/1
TABLET ORAL
Qty: 90 TABLET | Refills: 10 | OUTPATIENT
Start: 2024-04-05

## 2024-04-05 RX ORDER — LEVOTHYROXINE SODIUM 137 UG/1
137 TABLET ORAL
Qty: 90 TABLET | Refills: 1 | Status: SHIPPED | OUTPATIENT
Start: 2024-04-05 | End: 2024-04-08 | Stop reason: SDUPTHER

## 2024-04-07 ENCOUNTER — PATIENT MESSAGE (OUTPATIENT)
Dept: ENDOCRINOLOGY | Facility: CLINIC | Age: 24
End: 2024-04-07
Payer: MEDICARE

## 2024-04-07 DIAGNOSIS — E89.0 POSTABLATIVE HYPOTHYROIDISM: ICD-10-CM

## 2024-04-08 ENCOUNTER — TELEPHONE (OUTPATIENT)
Dept: ENDOCRINOLOGY | Facility: CLINIC | Age: 24
End: 2024-04-08
Payer: MEDICARE

## 2024-04-08 ENCOUNTER — PATIENT MESSAGE (OUTPATIENT)
Dept: ENDOCRINOLOGY | Facility: CLINIC | Age: 24
End: 2024-04-08
Payer: MEDICARE

## 2024-04-08 RX ORDER — LEVOTHYROXINE SODIUM 137 UG/1
137 TABLET ORAL
Qty: 90 TABLET | Refills: 1 | Status: SHIPPED | OUTPATIENT
Start: 2024-04-08 | End: 2024-04-25

## 2024-04-08 NOTE — TELEPHONE ENCOUNTER
Spoke with elvin from [facility] and she stated the patients mother called saying dusty medication was supposed to go up to 175 mcg. Instead the 137 mcg was sent so they just need clarification on what is the correct dose. Message will be sent to provider, understanding verbalized.

## 2024-04-08 NOTE — TELEPHONE ENCOUNTER
----- Message from Naseem Davies sent at 4/8/2024  9:33 AM CDT -----  Regarding: Olimpia with [facility]  Name of Caller: Olimpia     Pharmacy Name: [facility]     Prescription Name: levothyroxine (SYNTHROID) 137 MCG Tab tablet    What do they need to clarify? Need to verify prescription. Please reach out to the pharmacy     Can you be contacted via [facility]? No     Best Call Back Number: .  [facility] - [place] [facility] [facility] [place] [place] [address]  Phone: [phone] Fax: [phone]        Additional Information

## 2024-04-08 NOTE — TELEPHONE ENCOUNTER
Pt mother was informed that pt should be taking 137 mcg. Per provider note in feb pt was on too much thyroid medication so he decreased the dose. To 137 mcg not the 175 mcg. Mother wanted to note that pt was still taking the 175 mcg but will have pt take the 137 mcg. Pt verbalized understanding.

## 2024-04-25 ENCOUNTER — PATIENT MESSAGE (OUTPATIENT)
Dept: ENDOCRINOLOGY | Facility: CLINIC | Age: 24
End: 2024-04-25
Payer: MEDICARE

## 2024-04-25 DIAGNOSIS — E89.0 POSTABLATIVE HYPOTHYROIDISM: Primary | ICD-10-CM

## 2024-04-25 RX ORDER — LEVOTHYROXINE SODIUM 137 UG/1
137 TABLET ORAL
Qty: 90 TABLET | Refills: 3 | Status: SHIPPED | OUTPATIENT
Start: 2024-04-25 | End: 2025-04-25

## 2024-05-27 ENCOUNTER — PATIENT MESSAGE (OUTPATIENT)
Dept: FAMILY MEDICINE | Facility: CLINIC | Age: 24
End: 2024-05-27
Payer: MEDICARE

## 2024-05-27 RX ORDER — METFORMIN HYDROCHLORIDE 500 MG/1
500 TABLET ORAL 2 TIMES DAILY WITH MEALS
Qty: 60 TABLET | Refills: 11 | Status: SHIPPED | OUTPATIENT
Start: 2024-05-27 | End: 2024-05-28

## 2024-05-27 NOTE — TELEPHONE ENCOUNTER
Care Due:                  Date            Visit Type   Department     Provider  --------------------------------------------------------------------------------                                [facility]                              PRIMARY      MED/ INTERNAL  Last Visit: 03-      CARE ([facility])   MED/ PEDS      Ivonne Encarnacion                              [facility]                              PRIMARY      MED/ INTERNAL  Next Visit: 09-      CARE ([facility])   MED/ PEDS      Ivonne Encarnacion                                                            Last  Test          Frequency    Reason                     Performed    Due Date  --------------------------------------------------------------------------------    CMP.........  12 months..  pravastatin..............  Not Found    Overdue    Lipid Panel.  12 months..  pravastatin..............  10-   09-    [facility] Embedded Care Due Messages. Reference number: [id number].   5/27/2024 12:54:44 PM CDT

## 2024-05-28 ENCOUNTER — TELEPHONE (OUTPATIENT)
Dept: FAMILY MEDICINE | Facility: CLINIC | Age: 24
End: 2024-05-28
Payer: MEDICARE

## 2024-05-28 DIAGNOSIS — R73.03 PRE-DIABETES: Primary | ICD-10-CM

## 2024-05-28 RX ORDER — METFORMIN HYDROCHLORIDE 500 MG/1
500 TABLET, EXTENDED RELEASE ORAL 2 TIMES DAILY WITH MEALS
Qty: 180 TABLET | Refills: 3 | Status: SHIPPED | OUTPATIENT
Start: 2024-05-28 | End: 2025-05-28

## 2024-05-28 NOTE — TELEPHONE ENCOUNTER
Pharmacy states previous Rx was for Metformin  mg BID and new Rx is for regular Metformin. Pharmacy wants verbal clarification of the dose change.

## 2024-05-28 NOTE — TELEPHONE ENCOUNTER
----- Message from Jennifer Encarnacion sent at 5/28/2024 10:41 AM CDT -----  Type:  Pharmacy Calling to Clarify an RX    Name of Caller: Shanta     Pharmacy Name: .  [facility] - [place]  [address] [facility]  [address] [facility] [address] [address] [place] [address]  Phone: [phone] Fax: [phone]        Prescription Name:metFORMIN (GLUCOPHAGE) 500 MG tablet    What do they need to clarify? On medication itself    Can you be contacted via [facility]? Call Back     Best Call Back Number: [phone]    Additional Information:

## 2024-06-13 ENCOUNTER — OFFICE VISIT (OUTPATIENT)
Dept: FAMILY MEDICINE | Facility: CLINIC | Age: 24
End: 2024-06-13
Payer: MEDICARE

## 2024-06-13 DIAGNOSIS — R73.03 PRE-DIABETES: ICD-10-CM

## 2024-06-13 DIAGNOSIS — E66.01 CLASS 3 SEVERE OBESITY DUE TO EXCESS CALORIES WITH SERIOUS COMORBIDITY AND BODY MASS INDEX (BMI) OF 40.0 TO 44.9 IN ADULT: Primary | ICD-10-CM

## 2024-06-13 PROCEDURE — 1159F MED LIST DOCD IN RCRD: CPT | Mod: CPTII,95,, | Performed by: FAMILY MEDICINE

## 2024-06-13 PROCEDURE — 3044F HG A1C LEVEL LT 7.0%: CPT | Mod: CPTII,95,, | Performed by: FAMILY MEDICINE

## 2024-06-13 PROCEDURE — 99214 OFFICE O/P EST MOD 30 MIN: CPT | Mod: 95,,, | Performed by: FAMILY MEDICINE

## 2024-06-13 PROCEDURE — 1160F RVW MEDS BY RX/DR IN RCRD: CPT | Mod: CPTII,95,, | Performed by: FAMILY MEDICINE

## 2024-06-13 RX ORDER — TIRZEPATIDE 2.5 MG/.5ML
2.5 INJECTION, SOLUTION SUBCUTANEOUS
Qty: 2 ML | Refills: 11 | Status: SHIPPED | OUTPATIENT
Start: 2024-06-13 | End: 2025-06-13

## 2024-06-13 RX ORDER — RISPERIDONE 37.5MG/2ML
37.5 KIT INTRAMUSCULAR
COMMUNITY

## 2024-06-13 NOTE — PROGRESS NOTES
Assessment & Plan:    Class 3 severe obesity due to excess calories with serious comorbidity and body mass index (BMI) of 40.0 to 44.9 in adult  -     tirzepatide (MOUNJARO) 2.5 mg/0.5 mL PnIj; Inject 2.5 mg into the skin every 7 days.  Dispense: 2 mL; Refill: 11    Pre-diabetes  -     tirzepatide (MOUNJARO) 2.5 mg/0.5 mL PnIj; Inject 2.5 mg into the skin every 7 days.  Dispense: 2 mL; Refill: 11    Trial of Mounjaro if covered by insurance and available. Discussed most common GI side effects. Recommended to eliminate unhealthy dietary temptations and encourage as much physical activity as possible. Titrate dose up if needed. May D/C metformin if patient is having good results with Mounjaro.    The patient location is:  Patient Car  The chief complaint leading to consultation is: noted below  Visit type: Virtual visit with synchronous audio and video  Total time spent with patient: 15 minutes  Each patient to whom he or she provides medical services by telemedicine is:  (1) informed of the relationship between the physician and patient and the respective role of any other health care provider with respect to management of the patient; and (2) notified that he or she may decline to receive medical services by telemedicine and may withdraw from such care at any time.    Follow-up: Follow up if symptoms worsen or fail to improve. Patient already has a follow up in September.     ______________________________________________________________________    Chief Complaint  Chief Complaint   Patient presents with    Increased Appetite       HPI  Francisca Rm is a 23 y.o. male with multiple medical diagnoses as listed in the medical history and problem list that presents in a virtual visit with his mother to discuss patient's high appetite leading to a weight gain of 15 lbs since his last visit. Pt is known to me with his last appointment 3/8/2024. Patient's mother states that he has a ferocious appetite and she has  difficulty in encouraging him not to overeat because he is an adult and has his own means to pay for extra meals and snacking. He takes metformin for diabetes prevention but she asks if he can try a GLP-1 agonist. Of note, he has been on clozapine for years, which can lead to weight gain, but this is the only medication that has been found, after many trials of other medications, to stabilize his mood without needing hospitalization. He was going to the gym regularly with a caretaker but has fallen off because they are no longer available.     PAST MEDICAL HISTORY:  Past Medical History:   Diagnosis Date    Autism spectrum disorder     Bipolar disorder     Hyperlipidemia     Postablative hypothyroidism     Schizophrenia        PAST SURGICAL HISTORY:  History reviewed. No pertinent surgical history.    SOCIAL HISTORY:  Social History     Socioeconomic History    Marital status: Single   Tobacco Use    Smoking status: Every Day    Smokeless tobacco: Never    Tobacco comments:     Smokes 3 - 4 cigars almost daily   Substance and Sexual Activity    Alcohol use: Not Currently    Drug use: Not Currently    Sexual activity: Not Currently     Social Determinants of Health     Financial Resource Strain: Low Risk  (1/8/2024)    Overall Financial Resource Strain (CARDIA)     Difficulty of Paying Living Expenses: Not hard at all   Food Insecurity: No Food Insecurity (1/8/2024)    Hunger Vital Sign     Worried About Running Out of Food in the Last Year: Never true     Ran Out of Food in the Last Year: Never true   Transportation Needs: No Transportation Needs (1/8/2024)    PRAPARE - Transportation     Lack of Transportation (Medical): No     Lack of Transportation (Non-Medical): No   Physical Activity: Insufficiently Active (1/8/2024)    Exercise Vital Sign     Days of Exercise per Week: 2 days     Minutes of Exercise per Session: 20 min   Stress: No Stress Concern Present (1/8/2024)    [employer] [place] [employer] -  Occupational Stress Questionnaire     Feeling of Stress : Only a little   Housing Stability: Unknown (1/8/2024)    Housing Stability Vital Sign     Unable to Pay for Housing in the Last Year: No     Unstable Housing in the Last Year: No       FAMILY HISTORY:  Family History   Problem Relation Name Age of Onset    No Known Problems Mother      No Known Problems Father      No Known Problems Sister      No Known Problems Brother      No Known Problems Maternal Aunt      No Known Problems Maternal Uncle      No Known Problems Paternal Aunt      No Known Problems Paternal Uncle      No Known Problems Maternal Grandmother      No Known Problems Maternal Grandfather      No Known Problems Paternal Grandmother      No Known Problems Paternal Grandfather      No Known Problems Other         ALLERGIES AND MEDICATIONS: updated and reviewed.  Review of patient's allergies indicates:  No Known Allergies  Current Outpatient Medications   Medication Sig Dispense Refill    cetirizine (ZYRTEC) 10 MG tablet Take 10 mg by mouth.      cloZAPine (CLOZARIL) 100 MG Tab Take 3 tablets by mouth every morning and 3 tablets every evening.      divalproex (DEPAKOTE) 500 MG TbEC Take by mouth.      EScitalopram oxalate (LEXAPRO) 10 MG tablet Take 15 mg by mouth once daily.      levothyroxine (SYNTHROID) 137 MCG Tab tablet Take 1 tablet (137 mcg total) by mouth before breakfast. 90 tablet 3    metFORMIN (GLUCOPHAGE-XR) 500 MG ER 24hr tablet Take 1 tablet (500 mg total) by mouth 2 (two) times daily with meals. 180 tablet 3    pravastatin (PRAVACHOL) 20 MG tablet TAKE ONE TABLET BY MOUTH ONCE DAILY. 90 tablet 10    propranoloL (INDERAL) 10 MG tablet TAKE 2 TABLETS BY MOUTH TWICE A  tablet 10    risperiDONE microspheres (RISPERDAL CONSTA) 37.5 mg/2 mL injection Inject 37.5 mg into the muscle every 14 (fourteen) days.      tirzepatide (MOUNJARO) 2.5 mg/0.5 mL PnIj Inject 2.5 mg into the skin every 7 days. 2 mL 11     No current  facility-administered medications for this visit.         ROS  Review of Systems   Constitutional:  Positive for activity change, fatigue and unexpected weight change.   Cardiovascular:  Negative for chest pain.   Endocrine: Positive for polydipsia and polyphagia. Negative for polyuria.   Skin:  Negative for pallor.   Neurological:  Positive for dizziness and headaches. Negative for tremors, seizures, speech difficulty and weakness.   Psychiatric/Behavioral:  Positive for confusion. The patient is not nervous/anxious.            Physical Exam  Physical Exam  Constitutional:       General: He is not in acute distress.  HENT:      Head: Normocephalic and atraumatic.      Right Ear: Tympanic membrane and ear canal normal.      Left Ear: Tympanic membrane and ear canal normal.      Nose: Nose normal.      Mouth/Throat:      Mouth: Mucous membranes are moist.      Pharynx: Oropharynx is clear.   Eyes:      Conjunctiva/sclera: Conjunctivae normal.      Pupils: Pupils are equal, round, and reactive to light.   Neck:      Thyroid: No thyromegaly.   Cardiovascular:      Rate and Rhythm: Normal rate and regular rhythm.      Pulses: Normal pulses.      Heart sounds: Normal heart sounds.   Pulmonary:      Effort: Pulmonary effort is normal. No respiratory distress.      Breath sounds: Normal breath sounds.   Musculoskeletal:      Cervical back: Neck supple.      Right lower leg: No edema.      Left lower leg: No edema.   Lymphadenopathy:      Cervical: No cervical adenopathy.   Skin:     General: Skin is warm and dry.      Findings: No rash.   Neurological:      General: No focal deficit present.      Mental Status: He is alert and oriented to person, place, and time.   Psychiatric:         Mood and Affect: Mood normal.         Behavior: Behavior normal.         Thought Content: Thought content normal.         *Physical exam limited by virtual visit.    Health Maintenance         Date Due Completion Date    COVID-19 Vaccine (5  - 2023-24 season) 09/01/2023 10/19/2022    Hemoglobin A1c (Prediabetes) 01/24/2025 1/24/2024    TETANUS VACCINE 12/07/2031 12/7/2021

## 2024-07-05 ENCOUNTER — PATIENT MESSAGE (OUTPATIENT)
Dept: FAMILY MEDICINE | Facility: CLINIC | Age: 24
End: 2024-07-05
Payer: COMMERCIAL

## 2024-07-08 ENCOUNTER — OFFICE VISIT (OUTPATIENT)
Dept: ENDOCRINOLOGY | Facility: CLINIC | Age: 24
End: 2024-07-08
Payer: MEDICARE

## 2024-07-08 VITALS
WEIGHT: 248.38 LBS | DIASTOLIC BLOOD PRESSURE: 84 MMHG | SYSTOLIC BLOOD PRESSURE: 132 MMHG | HEART RATE: 99 BPM | BODY MASS INDEX: 42.64 KG/M2

## 2024-07-08 DIAGNOSIS — R73.01 IFG (IMPAIRED FASTING GLUCOSE): ICD-10-CM

## 2024-07-08 DIAGNOSIS — F25.0 SCHIZOAFFECTIVE DISORDER, BIPOLAR TYPE: Chronic | ICD-10-CM

## 2024-07-08 DIAGNOSIS — E55.9 VITAMIN D DEFICIENCY: ICD-10-CM

## 2024-07-08 DIAGNOSIS — E89.0 POSTABLATIVE HYPOTHYROIDISM: Primary | ICD-10-CM

## 2024-07-08 PROCEDURE — 99999 PR PBB SHADOW E&M-EST. PATIENT-LVL III: CPT | Mod: PBBFAC,,, | Performed by: HOSPITALIST

## 2024-07-08 PROCEDURE — 1159F MED LIST DOCD IN RCRD: CPT | Mod: CPTII,S$GLB,, | Performed by: HOSPITALIST

## 2024-07-08 PROCEDURE — 3075F SYST BP GE 130 - 139MM HG: CPT | Mod: CPTII,S$GLB,, | Performed by: HOSPITALIST

## 2024-07-08 PROCEDURE — 3008F BODY MASS INDEX DOCD: CPT | Mod: CPTII,S$GLB,, | Performed by: HOSPITALIST

## 2024-07-08 PROCEDURE — 3044F HG A1C LEVEL LT 7.0%: CPT | Mod: CPTII,S$GLB,, | Performed by: HOSPITALIST

## 2024-07-08 PROCEDURE — 3079F DIAST BP 80-89 MM HG: CPT | Mod: CPTII,S$GLB,, | Performed by: HOSPITALIST

## 2024-07-08 PROCEDURE — 99214 OFFICE O/P EST MOD 30 MIN: CPT | Mod: S$GLB,,, | Performed by: HOSPITALIST

## 2024-07-08 RX ORDER — LEVOTHYROXINE SODIUM 137 UG/1
137 TABLET ORAL
Qty: 90 TABLET | Refills: 3 | Status: SHIPPED | OUTPATIENT
Start: 2024-07-08 | End: 2025-07-08

## 2024-07-08 NOTE — ASSESSMENT & PLAN NOTE
- Pt has a known h/o hypothyroidism diagnosed due to  post-surgical in setting of Graves disease as reported  - patient's medications being managed by his mother due to longstanding psychiatric illness  - I reviewed lab work trends: TSH, Free T4 with patient in clinic  today 7/8/2024  - Discussed correct administration of thyroid medication with the patient: advised to take it on an empty stomach with water and wait 30-45 minutes before eating or taking other medications. Patient demonstrated understanding.  - Check lab work tomorrow: TSH/T4, pending that results will make further adjustment.  - At this time will continue/change patient to Euthyrox name brand 137 mcg daily  - ONCE TSH is stable/normal, continue management and refills by patient's PCP: Ivonne Encarnacion,   - Follow-up scheduled for 6 months, as needed

## 2024-07-08 NOTE — PROGRESS NOTES
Subjective:      Patient ID: Francisca Rm is a 23 y.o. male presented to [facility] on 7/8/2024.  Chief Complaint:  Hypothyroidism    History of Present Illness: Francisca Rm is a 23 y.o. male here for  postablative hypothyroidism due to Graves disease  Other significant past medical history:  Schizoaffective disorder, obesity, tobacco user  Mother provided main history    Interval history:  Here for follow-up hypothyroidism Postablative   Mom main historian.    Currently on levothyroxine 137 mcg daily mom's make sure patient gets medication.    Mom's concern about not getting enough levothyroxine, poor absorption at the end of the month due to how medication is store? .  She is requesting Euthyrox  Per mother, patient is more perspiring more anxious at the end of the month and he is normally.  will go through these episode  Current in clinic weight: 248, previous weight 242 lb most recent office visit 12/2020, previous weight 221 (2/2023)  Prediabetes, PCP attempting to get patient Mounjaro.    Psych medication: Unchanged, still on Risperdal injection, Clozaril        1) Postablative hypothyroidism:   - 2017 had Graves disease>> Had VASQUEZ ablation in 2017  - Treated at [facility] here in [facility]  - Had psych issue at that time which masked his hyperthyroidism symptoms  - Family history thyroid disorder: maternal aunt  - Thyroid goiter: no  - Previous US/FNA: no  - Current medication: Generic levothyroxine 137 mcg  daily    >> mom gives him his medication> does not miss doses>>Take 30 min before any other medications     US thyroid   9/2017  THYROID ULTRASOUND: The thyroid gland appears mildly enlarged with right thyroid lobe. measured at 5.4 x 1.3 x 2.2 cm with thyroid volume estimated at 7.3 mL's. The left thyroid lobe is 4.9 x 1.1 x 1.8 cm with left thyroid volume estimated at 4.7 mL's. There is mild diffuse heterogeneity without obvious nodule or cyst. There is no  significant increased blood flow or additional abnormality. There is no significant adjacent adenopathy.     IMPRESSION: MILD DIFFUSE HETEROGENEITY AND ENLARGEMENT WITHOUT FOCAL DISEASE    Due to cognition, he does not report symptoms  Mom with noticed that patient would get more agitated or increase in diaphoresis on occasion>> if there are thyroid issue  No increases in weight recently.  No changes in mental status or behavior    Lab Results   Component Value Date    TSH 0.197 (L) 01/24/2024    TSH 2.535 02/15/2023    TSH 0.153 (L) 10/01/2022    FREET4 0.91 02/15/2023    FREET4 1.02 10/01/2022    THYROIDSTIMI <0.10 02/15/2023         2) Vit D Def  - Vit D3 [id number] 5 days a week    Lab Results   Component Value Date    TATOOIRX66MG 24.9 (L) 01/24/2024    NDMYJSTD75NT 73 02/15/2023    MJWGYCTR93RR 11 (L) 10/01/2022    CALCIUM 9.8 01/24/2024    CALCIUM 9.6 02/15/2023    CALCIUM 9.5 03/25/2022    TSH 0.197 (L) 01/24/2024      3) prediabetes  - per patient's mother on metformin daily   - started by PCP    Lab Results   Component Value Date    HGBA1C 6.1 (H) 01/24/2024       Reviewed past surgical, medical, family, social history and updated as appropriate.  Review of Systems: see HPI above    Objective:   /84   Pulse 99   Wt 112.7 kg (248 lb 6.4 oz)   BMI 42.64 kg/m²     Body mass index is 42.64 kg/m².  Vital signs reviewed    Physical Exam  Vitals and nursing note reviewed.   Constitutional:       Appearance: Normal appearance. He is well-developed. He is obese. He is not ill-appearing.   Neck:      Thyroid: No thyromegaly.   Pulmonary:      Effort: Pulmonary effort is normal. No respiratory distress.   Musculoskeletal:         General: Normal range of motion.      Cervical back: Normal range of motion.   Neurological:      General: No focal deficit present.      Mental Status: He is alert. Mental status is at baseline.   Psychiatric:         Mood and Affect: Mood normal.         Behavior: Behavior normal.      Lab Reviewed:  See results in subjective  Lab Results   Component Value Date    HGBA1C 6.1 (H) 01/24/2024     Lab Results   Component Value Date    CHOL 97 (L) 10/01/2022    HDL 37 (L) 10/01/2022    LDLCALC 45.0 (L) 10/01/2022    TRIG 75 10/01/2022    CHOLHDL 38.1 10/01/2022     Lab Results   Component Value Date     (H) 01/24/2024    K 3.8 01/24/2024     01/24/2024    CO2 23 01/24/2024     (H) 01/24/2024    BUN 13 01/24/2024    CREATININE 1.11 01/24/2024    CALCIUM 9.8 01/24/2024    ALBUMIN 4.4 03/25/2022    BILITOT 0.2 03/25/2022    AST 22 03/25/2022    ALT 22 03/25/2022    ANIONGAP 9 02/15/2023    EGFRNORACEVR 96 01/24/2024    TSH 0.197 (L) 01/24/2024    TPWUMPZE45QG 24.9 (L) 01/24/2024     Assessment     1. Postablative hypothyroidism  TSH    T4, Free    EUTHYROX 137 mcg Tab tablet    TSH    T4, Free      2. Schizoaffective disorder, bipolar type        3. IFG (impaired fasting glucose)        4. Vitamin D deficiency          Plan     Postablative hypothyroidism  - Pt has a known h/o hypothyroidism diagnosed due to  post-surgical in setting of Graves disease as reported  - patient's medications being managed by his mother due to longstanding psychiatric illness  - I reviewed lab work trends: TSH, Free T4 with patient in clinic  today 7/8/2024  - Discussed correct administration of thyroid medication with the patient: advised to take it on an empty stomach with water and wait 30-45 minutes before eating or taking other medications. Patient demonstrated understanding.  - Check lab work tomorrow: TSH/T4, pending that results will make further adjustment.  - At this time will continue/change patient to Euthyrox name brand 137 mcg daily  - ONCE TSH is stable/normal, continue management and refills by patient's PCP: Ivonne Encarnacion DO  - Follow-up scheduled for 6 months, as needed    Schizoaffective disorder  - On multiple medication at this time, mood is stable  - some can affect weight gain  therefore affect thyroid function    IFG (impaired fasting glucose)  - check A1c, advised patient to follow-up with PCP to discuss monitoring    Vitamin D deficiency  - Lab work reviewed, and discussed with patient in clinic  - Currently on Vit D supplement  - check lab work    RTC in 6 months    Pranav Barr M.D.  Endocrinology  [facility]  7/8/2024      Disclaimer: This note has been generated in part with the use of voice-recognition software. There may be typographical errors that have been missed during proof-reading.

## 2024-07-09 ENCOUNTER — LAB VISIT (OUTPATIENT)
Dept: LAB | Facility: HOSPITAL | Age: 24
End: 2024-07-09
Payer: COMMERCIAL

## 2024-07-09 DIAGNOSIS — E89.0 POSTABLATIVE HYPOTHYROIDISM: ICD-10-CM

## 2024-07-09 DIAGNOSIS — R73.03 PRE-DIABETES: ICD-10-CM

## 2024-07-09 DIAGNOSIS — E55.9 HYPOVITAMINOSIS D: ICD-10-CM

## 2024-07-09 DIAGNOSIS — E78.5 DYSLIPIDEMIA: ICD-10-CM

## 2024-07-09 LAB
25(OH)D3+25(OH)D2 SERPL-MCNC: 25 NG/ML (ref 30–96)
CHOLEST SERPL-MCNC: 109 MG/DL (ref 120–199)
CHOLEST/HDLC SERPL: 3 {RATIO} (ref 2–5)
ESTIMATED AVG GLUCOSE: 117 MG/DL (ref 68–131)
HBA1C MFR BLD: 5.7 % (ref 4–5.6)
HDLC SERPL-MCNC: 36 MG/DL (ref 40–75)
HDLC SERPL: 33 % (ref 20–50)
LDLC SERPL CALC-MCNC: 50 MG/DL (ref 63–159)
NONHDLC SERPL-MCNC: 73 MG/DL
T4 FREE SERPL-MCNC: 0.87 NG/DL (ref 0.71–1.51)
TRIGL SERPL-MCNC: 115 MG/DL (ref 30–150)
TSH SERPL DL<=0.005 MIU/L-ACNC: 3.36 UIU/ML (ref 0.4–4)

## 2024-07-09 PROCEDURE — 82306 VITAMIN D 25 HYDROXY: CPT | Performed by: FAMILY MEDICINE

## 2024-07-09 PROCEDURE — 36415 COLL VENOUS BLD VENIPUNCTURE: CPT | Mod: PO | Performed by: FAMILY MEDICINE

## 2024-07-09 PROCEDURE — 84439 ASSAY OF FREE THYROXINE: CPT | Performed by: HOSPITALIST

## 2024-07-09 PROCEDURE — 80061 LIPID PANEL: CPT | Performed by: FAMILY MEDICINE

## 2024-07-09 PROCEDURE — 84443 ASSAY THYROID STIM HORMONE: CPT | Performed by: HOSPITALIST

## 2024-07-09 PROCEDURE — 83036 HEMOGLOBIN GLYCOSYLATED A1C: CPT | Performed by: FAMILY MEDICINE

## 2024-07-15 ENCOUNTER — PATIENT MESSAGE (OUTPATIENT)
Dept: FAMILY MEDICINE | Facility: CLINIC | Age: 24
End: 2024-07-15
Payer: COMMERCIAL

## 2024-07-15 ENCOUNTER — PATIENT MESSAGE (OUTPATIENT)
Dept: PULMONOLOGY | Facility: CLINIC | Age: 24
End: 2024-07-15
Payer: COMMERCIAL

## 2024-07-17 ENCOUNTER — TELEPHONE (OUTPATIENT)
Dept: FAMILY MEDICINE | Facility: CLINIC | Age: 24
End: 2024-07-17
Payer: COMMERCIAL

## 2024-07-17 NOTE — TELEPHONE ENCOUNTER
Patient presented to clinic and completed 90L was retrieved. Paperwork was also faxed [phone] to said entity for patient. Thank you verbalized.

## 2024-07-31 ENCOUNTER — PATIENT MESSAGE (OUTPATIENT)
Dept: FAMILY MEDICINE | Facility: CLINIC | Age: 24
End: 2024-07-31
Payer: COMMERCIAL

## 2024-09-17 ENCOUNTER — PATIENT MESSAGE (OUTPATIENT)
Dept: FAMILY MEDICINE | Facility: CLINIC | Age: 24
End: 2024-09-17

## 2024-09-27 ENCOUNTER — OFFICE VISIT (OUTPATIENT)
Dept: FAMILY MEDICINE | Facility: CLINIC | Age: 24
End: 2024-09-27
Payer: COMMERCIAL

## 2024-09-27 DIAGNOSIS — E55.9 HYPOVITAMINOSIS D: ICD-10-CM

## 2024-09-27 DIAGNOSIS — I10 HYPERTENSION, UNSPECIFIED TYPE: ICD-10-CM

## 2024-09-27 DIAGNOSIS — E78.5 DYSLIPIDEMIA: ICD-10-CM

## 2024-09-27 DIAGNOSIS — R73.03 PRE-DIABETES: ICD-10-CM

## 2024-09-27 DIAGNOSIS — E89.0 POSTABLATIVE HYPOTHYROIDISM: ICD-10-CM

## 2024-09-27 DIAGNOSIS — Z86.39 HISTORY OF GRAVES' DISEASE: ICD-10-CM

## 2024-09-27 DIAGNOSIS — E66.01 CLASS 3 SEVERE OBESITY DUE TO EXCESS CALORIES WITH SERIOUS COMORBIDITY AND BODY MASS INDEX (BMI) OF 40.0 TO 44.9 IN ADULT: Primary | ICD-10-CM

## 2024-09-27 RX ORDER — PRAVASTATIN SODIUM 20 MG/1
20 TABLET ORAL DAILY
Qty: 90 TABLET | Refills: 3 | Status: SHIPPED | OUTPATIENT
Start: 2024-09-27

## 2024-09-27 RX ORDER — INSULIN PUMP SYRINGE, 3 ML
EACH MISCELLANEOUS
Qty: 1 EACH | Refills: 0 | Status: SHIPPED | OUTPATIENT
Start: 2024-09-27

## 2024-09-27 RX ORDER — LANCETS
EACH MISCELLANEOUS
Qty: 200 EACH | Refills: 11 | Status: SHIPPED | OUTPATIENT
Start: 2024-09-27

## 2024-09-27 RX ORDER — TIRZEPATIDE 5 MG/.5ML
5 INJECTION, SOLUTION SUBCUTANEOUS
Qty: 2 ML | Refills: 11 | Status: SHIPPED | OUTPATIENT
Start: 2024-09-27 | End: 2025-09-27

## 2024-09-27 NOTE — PROGRESS NOTES
Assessment & Plan:    Class 3 severe obesity due to excess calories with serious comorbidity and body mass index (BMI) of 40.0 to 44.9 in adult  -     tirzepatide (MOUNJARO) 5 mg/0.5 mL PnIj; Inject 5 mg into the skin every 7 days.  Dispense: 2 mL; Refill: 11    Pre-diabetes  -     tirzepatide (MOUNJARO) 5 mg/0.5 mL PnIj; Inject 5 mg into the skin every 7 days.  Dispense: 2 mL; Refill: 11  -     blood-glucose meter kit; To check BG daily, to use with insurance preferred meter  Dispense: 1 each; Refill: 0  -     lancets Misc; To check BG daily, to use with insurance preferred meter  Dispense: 200 each; Refill: 11  -     blood sugar diagnostic Strp; To check BG daily, to use with insurance preferred meter  Dispense: 200 each; Refill: 11  -     Hemoglobin A1C; Future; Expected date: 09/27/2024  -     Comprehensive Metabolic Panel; Future; Expected date: 09/27/2024    Increase Mounjaro to 5 mg.   Glucometer and supplies prescribed. Reviewed goal BG .  Due for repeat labs in about 6 mo after last labs done in July - will link to lab appt in late Dec.    History of Graves' disease   Postablative hypothyroidism  -     Comprehensive Metabolic Panel; Future; Expected date: 09/27/2024    Controlled. Continue current therapy.     Dyslipidemia  -     Lipid Panel; Future; Expected date: 09/27/2024  -     pravastatin (PRAVACHOL) 20 MG tablet; Take 1 tablet (20 mg total) by mouth once daily.  Dispense: 90 tablet; Refill: 3    Controlled. Medication(s) refilled.     Other secondary hypertension  -     Hemoglobin A1C; Future; Expected date: 09/27/2024  -     Lipid Panel; Future; Expected date: 09/27/2024  -     Comprehensive Metabolic Panel; Future; Expected date: 09/27/2024  -     CBC Auto Differential; Future; Expected date: 09/27/2024    Controlled. Continue current therapy.     Hypovitaminosis D  -     Vitamin D; Future; Expected date: 09/27/2024    Patient's mother giving him vitamin D supplements since July.     The  patient location is:  Patient Home   The chief complaint leading to consultation is: noted below  Visit type: Virtual visit with synchronous audio and video  Total time spent with patient: 10 minutes  Each patient to whom he or she provides medical services by telemedicine is:  (1) informed of the relationship between the physician and patient and the respective role of any other health care provider with respect to management of the patient; and (2) notified that he or she may decline to receive medical services by telemedicine and may withdraw from such care at any time.    Follow-up: already scheduled in January  ________________________________________________________________    Chief Complaint  Chief Complaint   Patient presents with    Health Maintenance       HPI  Francisca Rm is a 23 y.o. male with multiple medical diagnoses as listed in the medical history and problem list that presents in a virtual visit with his mother to follow up on his chronic conditions. Pt is known to me with his last appointment 6/13/2024. They report no new concerns. He has been taking Mounjaro 2.5 mg with metformin 500 mg bid. His mother states that his appetite remains high. She would like to start checking his BG. Requesting refill of pravastatin.     PAST MEDICAL HISTORY:  Past Medical History:   Diagnosis Date    Autism spectrum disorder     Bipolar disorder     Hyperlipidemia     Postablative hypothyroidism     Schizophrenia        PAST SURGICAL HISTORY:  History reviewed. No pertinent surgical history.    SOCIAL HISTORY:  Social History     Socioeconomic History    Marital status: Single   Tobacco Use    Smoking status: Every Day    Smokeless tobacco: Never    Tobacco comments:     Smokes 3 - 4 cigars almost daily   Substance and Sexual Activity    Alcohol use: Not Currently    Drug use: Not Currently    Sexual activity: Not Currently     Social Determinants of Health     Financial Resource Strain: Low Risk   (1/8/2024)    Overall Financial Resource Strain (CARDIA)     Difficulty of Paying Living Expenses: Not hard at all   Food Insecurity: No Food Insecurity (1/8/2024)    Hunger Vital Sign     Worried About Running Out of Food in the Last Year: Never true     Ran Out of Food in the Last Year: Never true   Transportation Needs: No Transportation Needs (1/8/2024)    PRAPARE - Transportation     Lack of Transportation (Medical): No     Lack of Transportation (Non-Medical): No   Physical Activity: Insufficiently Active (1/8/2024)    Exercise Vital Sign     Days of Exercise per Week: 2 days     Minutes of Exercise per Session: 20 min   Stress: No Stress Concern Present (1/8/2024)    [employer] - Occupational Stress Questionnaire     Feeling of Stress : Only a little   Housing Stability: Unknown (1/8/2024)    Housing Stability Vital Sign     Unable to Pay for Housing in the Last Year: No     Unstable Housing in the Last Year: No       FAMILY HISTORY:  Family History   Problem Relation Name Age of Onset    No Known Problems Mother      No Known Problems Father      No Known Problems Sister      No Known Problems Brother      No Known Problems Maternal Aunt      No Known Problems Maternal Uncle      No Known Problems Paternal Aunt      No Known Problems Paternal Uncle      No Known Problems Maternal Grandmother      No Known Problems Maternal Grandfather      No Known Problems Paternal Grandmother      No Known Problems Paternal Grandfather      No Known Problems Other         ALLERGIES AND MEDICATIONS: updated and reviewed.  Review of patient's allergies indicates:  No Known Allergies  Current Outpatient Medications   Medication Sig Dispense Refill    blood sugar diagnostic Strp To check BG daily, to use with insurance preferred meter 200 each 11    blood-glucose meter kit To check BG daily, to use with insurance preferred meter 1 each 0    cetirizine (ZYRTEC) 10 MG tablet Take 10 mg by mouth.       cloZAPine (CLOZARIL) 100 MG Tab Take 3 tablets by mouth every morning and 3 tablets every evening.      divalproex (DEPAKOTE) 500 MG TbEC Take by mouth.      EScitalopram oxalate (LEXAPRO) 10 MG tablet Take 15 mg by mouth once daily.      EUTHYROX 137 mcg Tab tablet Take 1 tablet (137 mcg total) by mouth before breakfast. 90 tablet 3    lancets Misc To check BG daily, to use with insurance preferred meter 200 each 11    levothyroxine (SYNTHROID) 137 MCG Tab tablet Take 1 tablet (137 mcg total) by mouth before breakfast. 90 tablet 3    metFORMIN (GLUCOPHAGE-XR) 500 MG ER 24hr tablet Take 1 tablet (500 mg total) by mouth 2 (two) times daily with meals. 180 tablet 3    pravastatin (PRAVACHOL) 20 MG tablet Take 1 tablet (20 mg total) by mouth once daily. 90 tablet 3    propranoloL (INDERAL) 10 MG tablet TAKE 2 TABLETS BY MOUTH TWICE A  tablet 10    risperiDONE microspheres (RISPERDAL CONSTA) 37.5 mg/2 mL injection Inject 37.5 mg into the muscle every 14 (fourteen) days.      tirzepatide (MOUNJARO) 5 mg/0.5 mL PnIj Inject 5 mg into the skin every 7 days. 2 mL 11     No current facility-administered medications for this visit.         ROS  Review of Systems   Constitutional:  Positive for activity change. Negative for unexpected weight change.   HENT:  Negative for hearing loss, rhinorrhea and trouble swallowing.    Eyes:  Negative for discharge and visual disturbance.   Respiratory:  Negative for chest tightness and wheezing.    Cardiovascular:  Negative for chest pain and palpitations.   Gastrointestinal:  Negative for blood in stool, constipation, diarrhea and vomiting.   Endocrine: Negative for polydipsia and polyuria.   Genitourinary:  Negative for difficulty urinating, hematuria and urgency.   Musculoskeletal:  Negative for arthralgias, joint swelling and neck pain.   Neurological:  Negative for weakness and headaches.   Psychiatric/Behavioral:  Negative for confusion and dysphoric mood.             Physical Exam  Physical Exam  Constitutional:       General: He is not in acute distress.  HENT:      Head: Normocephalic and atraumatic.   Neurological:      Mental Status: He is alert. Mental status is at baseline.   Psychiatric:         Mood and Affect: Mood normal.         Behavior: Behavior normal.         *Physical exam limited by virtual visit.    Health Maintenance         Date Due Completion Date    Influenza Vaccine (1) 09/01/2024 11/27/2023    COVID-19 Vaccine (5 - 2023-24 season) 09/01/2024 10/19/2022    Hemoglobin A1c (Prediabetes) 07/26/2025 7/26/2024    TETANUS VACCINE 12/07/2031 12/7/2021

## 2024-10-25 ENCOUNTER — PATIENT MESSAGE (OUTPATIENT)
Dept: FAMILY MEDICINE | Facility: CLINIC | Age: 24
End: 2024-10-25
Payer: COMMERCIAL

## 2025-01-03 ENCOUNTER — PATIENT MESSAGE (OUTPATIENT)
Dept: FAMILY MEDICINE | Facility: CLINIC | Age: 25
End: 2025-01-03
Payer: MEDICARE

## 2025-01-08 ENCOUNTER — LAB VISIT (OUTPATIENT)
Dept: LAB | Facility: HOSPITAL | Age: 25
End: 2025-01-08
Attending: FAMILY MEDICINE
Payer: COMMERCIAL

## 2025-01-08 ENCOUNTER — OFFICE VISIT (OUTPATIENT)
Dept: ENDOCRINOLOGY | Facility: CLINIC | Age: 25
End: 2025-01-08
Payer: COMMERCIAL

## 2025-01-08 VITALS
WEIGHT: 238 LBS | HEART RATE: 115 BPM | SYSTOLIC BLOOD PRESSURE: 120 MMHG | DIASTOLIC BLOOD PRESSURE: 86 MMHG | BODY MASS INDEX: 40.85 KG/M2

## 2025-01-08 DIAGNOSIS — R73.03 PRE-DIABETES: ICD-10-CM

## 2025-01-08 DIAGNOSIS — E89.0 POSTABLATIVE HYPOTHYROIDISM: ICD-10-CM

## 2025-01-08 DIAGNOSIS — E55.9 VITAMIN D DEFICIENCY: ICD-10-CM

## 2025-01-08 DIAGNOSIS — E78.5 DYSLIPIDEMIA: ICD-10-CM

## 2025-01-08 DIAGNOSIS — E66.812 CLASS 2 SEVERE OBESITY DUE TO EXCESS CALORIES WITH SERIOUS COMORBIDITY AND BODY MASS INDEX (BMI) OF 38.0 TO 38.9 IN ADULT: ICD-10-CM

## 2025-01-08 DIAGNOSIS — E89.0 POSTABLATIVE HYPOTHYROIDISM: Primary | ICD-10-CM

## 2025-01-08 DIAGNOSIS — R73.01 IFG (IMPAIRED FASTING GLUCOSE): ICD-10-CM

## 2025-01-08 DIAGNOSIS — I10 HYPERTENSION, UNSPECIFIED TYPE: ICD-10-CM

## 2025-01-08 DIAGNOSIS — E66.01 CLASS 2 SEVERE OBESITY DUE TO EXCESS CALORIES WITH SERIOUS COMORBIDITY AND BODY MASS INDEX (BMI) OF 38.0 TO 38.9 IN ADULT: ICD-10-CM

## 2025-01-08 DIAGNOSIS — F25.0 SCHIZOAFFECTIVE DISORDER, BIPOLAR TYPE: Chronic | ICD-10-CM

## 2025-01-08 DIAGNOSIS — E55.9 HYPOVITAMINOSIS D: ICD-10-CM

## 2025-01-08 LAB
25(OH)D3+25(OH)D2 SERPL-MCNC: 52 NG/ML (ref 30–96)
ALBUMIN SERPL BCP-MCNC: 3.8 G/DL (ref 3.5–5.2)
ALP SERPL-CCNC: 60 U/L (ref 40–150)
ALT SERPL W/O P-5'-P-CCNC: 21 U/L (ref 10–44)
ANION GAP SERPL CALC-SCNC: 7 MMOL/L (ref 8–16)
AST SERPL-CCNC: 15 U/L (ref 10–40)
BASOPHILS # BLD AUTO: 0.03 K/UL (ref 0–0.2)
BASOPHILS NFR BLD: 0.6 % (ref 0–1.9)
BILIRUB SERPL-MCNC: 0.2 MG/DL (ref 0.1–1)
BUN SERPL-MCNC: 10 MG/DL (ref 6–20)
CALCIUM SERPL-MCNC: 9.1 MG/DL (ref 8.7–10.5)
CHLORIDE SERPL-SCNC: 108 MMOL/L (ref 95–110)
CHOLEST SERPL-MCNC: 85 MG/DL (ref 120–199)
CHOLEST/HDLC SERPL: 2.6 {RATIO} (ref 2–5)
CO2 SERPL-SCNC: 25 MMOL/L (ref 23–29)
CREAT SERPL-MCNC: 1.3 MG/DL (ref 0.5–1.4)
DIFFERENTIAL METHOD BLD: ABNORMAL
EOSINOPHIL # BLD AUTO: 0.2 K/UL (ref 0–0.5)
EOSINOPHIL NFR BLD: 3.7 % (ref 0–8)
ERYTHROCYTE [DISTWIDTH] IN BLOOD BY AUTOMATED COUNT: 15.7 % (ref 11.5–14.5)
EST. GFR  (NO RACE VARIABLE): >60 ML/MIN/1.73 M^2
ESTIMATED AVG GLUCOSE: 111 MG/DL (ref 68–131)
GLUCOSE SERPL-MCNC: 90 MG/DL (ref 70–110)
HBA1C MFR BLD: 5.5 % (ref 4–5.6)
HCT VFR BLD AUTO: 41.8 % (ref 40–54)
HDLC SERPL-MCNC: 33 MG/DL (ref 40–75)
HDLC SERPL: 38.8 % (ref 20–50)
HGB BLD-MCNC: 13.8 G/DL (ref 14–18)
IMM GRANULOCYTES # BLD AUTO: 0.05 K/UL (ref 0–0.04)
IMM GRANULOCYTES NFR BLD AUTO: 1 % (ref 0–0.5)
LDLC SERPL CALC-MCNC: 36.2 MG/DL (ref 63–159)
LYMPHOCYTES # BLD AUTO: 1.9 K/UL (ref 1–4.8)
LYMPHOCYTES NFR BLD: 39.2 % (ref 18–48)
MCH RBC QN AUTO: 29.9 PG (ref 27–31)
MCHC RBC AUTO-ENTMCNC: 33 G/DL (ref 32–36)
MCV RBC AUTO: 91 FL (ref 82–98)
MONOCYTES # BLD AUTO: 0.8 K/UL (ref 0.3–1)
MONOCYTES NFR BLD: 16.7 % (ref 4–15)
NEUTROPHILS # BLD AUTO: 1.9 K/UL (ref 1.8–7.7)
NEUTROPHILS NFR BLD: 38.8 % (ref 38–73)
NONHDLC SERPL-MCNC: 52 MG/DL
NRBC BLD-RTO: 0 /100 WBC
PLATELET # BLD AUTO: 194 K/UL (ref 150–450)
PMV BLD AUTO: 10.9 FL (ref 9.2–12.9)
POTASSIUM SERPL-SCNC: 4 MMOL/L (ref 3.5–5.1)
PROT SERPL-MCNC: 7 G/DL (ref 6–8.4)
RBC # BLD AUTO: 4.61 M/UL (ref 4.6–6.2)
SODIUM SERPL-SCNC: 140 MMOL/L (ref 136–145)
TRIGL SERPL-MCNC: 79 MG/DL (ref 30–150)
WBC # BLD AUTO: 4.85 K/UL (ref 3.9–12.7)

## 2025-01-08 PROCEDURE — 36415 COLL VENOUS BLD VENIPUNCTURE: CPT | Mod: PO | Performed by: FAMILY MEDICINE

## 2025-01-08 PROCEDURE — 83036 HEMOGLOBIN GLYCOSYLATED A1C: CPT | Performed by: FAMILY MEDICINE

## 2025-01-08 PROCEDURE — 3044F HG A1C LEVEL LT 7.0%: CPT | Mod: CPTII,S$GLB,, | Performed by: HOSPITALIST

## 2025-01-08 PROCEDURE — 80053 COMPREHEN METABOLIC PANEL: CPT | Performed by: FAMILY MEDICINE

## 2025-01-08 PROCEDURE — 99999 PR PBB SHADOW E&M-EST. PATIENT-LVL III: CPT | Mod: PBBFAC,,, | Performed by: HOSPITALIST

## 2025-01-08 PROCEDURE — 85025 COMPLETE CBC W/AUTO DIFF WBC: CPT | Performed by: FAMILY MEDICINE

## 2025-01-08 PROCEDURE — 3079F DIAST BP 80-89 MM HG: CPT | Mod: CPTII,S$GLB,, | Performed by: HOSPITALIST

## 2025-01-08 PROCEDURE — G2211 COMPLEX E/M VISIT ADD ON: HCPCS | Mod: S$GLB,,, | Performed by: HOSPITALIST

## 2025-01-08 PROCEDURE — 82306 VITAMIN D 25 HYDROXY: CPT | Performed by: FAMILY MEDICINE

## 2025-01-08 PROCEDURE — 1159F MED LIST DOCD IN RCRD: CPT | Mod: CPTII,S$GLB,, | Performed by: HOSPITALIST

## 2025-01-08 PROCEDURE — 1160F RVW MEDS BY RX/DR IN RCRD: CPT | Mod: CPTII,S$GLB,, | Performed by: HOSPITALIST

## 2025-01-08 PROCEDURE — 3008F BODY MASS INDEX DOCD: CPT | Mod: CPTII,S$GLB,, | Performed by: HOSPITALIST

## 2025-01-08 PROCEDURE — 99214 OFFICE O/P EST MOD 30 MIN: CPT | Mod: S$GLB,,, | Performed by: HOSPITALIST

## 2025-01-08 PROCEDURE — 80061 LIPID PANEL: CPT | Performed by: FAMILY MEDICINE

## 2025-01-08 PROCEDURE — 3074F SYST BP LT 130 MM HG: CPT | Mod: CPTII,S$GLB,, | Performed by: HOSPITALIST

## 2025-01-08 RX ORDER — LEVOTHYROXINE SODIUM 150 UG/1
150 TABLET ORAL
Qty: 90 TABLET | Refills: 3 | Status: SHIPPED | OUTPATIENT
Start: 2025-01-08 | End: 2026-01-08

## 2025-01-08 NOTE — ASSESSMENT & PLAN NOTE
- Pt has a known h/o hypothyroidism diagnosed due to  post-surgical in setting of Graves disease as reported  - Patient's medications being managed by his mother due to longstanding psychiatric illness  - I reviewed lab work trends: TSH, Free T4 with patient in clinic  today 1/8/2025  - Discussed correct administration of thyroid medication with the patient: advised to take it on an empty stomach with water and wait 30-45 minutes before eating or taking other medications. Patient demonstrated understanding.  - TSH T4 checked today: TSH 6.0, normal free T4  - Increase Euthyrox name brand 150 mcg daily  - ONCE TSH is stable/normal, continue management and refills by patient's PCP: Ivonne Encarnacion,   - Follow-up scheduled for 6 months, as needed

## 2025-01-08 NOTE — PROGRESS NOTES
Subjective:      Patient ID: Francisca Rm is a 24 y.o. male presented to [facility] on 1/8/2025.  Chief Complaint:  Hypothyroidism    History of Present Illness: Francisca Rm is a 24 y.o. male here for  postablative hypothyroidism due to Graves disease  Other significant past medical history:  Schizoaffective disorder, obesity, tobacco user  Mother provided main history      Interval history:  Here for follow-up Postablative hypothyroidism    Mom main historian.  Currently on Euthyrox 137 mcg daily mom's make sure patient gets medication.    Per mother.  Less anxiety less agitation.  Obesity: Current in clinic weight: 238 , previous weight 248< 242 lb most recent office visit 12/2020, previous weight 221 (2/2023)  On Mounjaro managed by PCP, weight loss noted and treatment Prediabetes  Psych medication: Unchanged, still on Risperdal injection, Clozaril      1) Postablative hypothyroidism:   - 2017 had Graves disease>> Had VASQUEZ ablation in 2017  - Treated at [facility] here in town  - Had psych issue at that time which masked his hyperthyroidism symptoms  - Family history thyroid disorder: maternal aunt  - Thyroid goiter: no  - Previous US/FNA: no  - Current medication:  Name brand Euthyrox 137 mcg  daily    >> mom gives him his medication> does not miss doses>>Take 30 min before any other medications     US thyroid   9/2017  THYROID ULTRASOUND: The thyroid gland appears mildly enlarged with right thyroid lobe. measured at 5.4 x 1.3 x 2.2 cm with thyroid volume estimated at 7.3 mL's. The left thyroid lobe is 4.9 x 1.1 x 1.8 cm with left thyroid volume estimated at 4.7 mL's. There is mild diffuse heterogeneity without obvious nodule or cyst. There is no significant increased blood flow or additional abnormality. There is no significant adjacent adenopathy.     IMPRESSION: MILD DIFFUSE HETEROGENEITY AND ENLARGEMENT WITHOUT FOCAL DISEASE    Due to cognition, he does not report  symptoms  Mom with noticed that patient would get more agitated or increase in diaphoresis on occasion>> if there are thyroid issue  No increases in weight recently.  No changes in mental status or behavior    Lab Results   Component Value Date    TSH 6.050 (H) 01/08/2025    TSH 3.359 07/09/2024    TSH 0.197 (L) 01/24/2024    FREET4 0.87 01/08/2025    FREET4 0.87 07/09/2024    FREET4 0.91 02/15/2023    THYROIDSTIMI <0.10 02/15/2023         2) Vit D Def  - Vit D3 2000iu 5 days a week  - vitamin-D level at goal 01/2025      Lab Results   Component Value Date    EPCGKFBG23CG 52 01/08/2025    ZEGKEXXF59JB 25 (L) 07/09/2024    ZAASGDNT15GG 24.9 (L) 01/24/2024    CALCIUM 9.1 01/08/2025    CALCIUM 9.8 01/24/2024    CALCIUM 9.6 02/15/2023    ALKPHOS 60 01/08/2025    TSH 6.050 (H) 01/08/2025      3) Prediabetes  - per patient's mother on metformin daily   - started by PCP  - Mounjaro    Lab Results   Component Value Date    HGBA1C 5.5 01/08/2025     Reviewed past surgical, medical, family, social history and updated as appropriate.  Review of Systems: see HPI above    Objective:   /86   Pulse (!) 115   Wt 108 kg (238 lb)   BMI 40.85 kg/m²     Body mass index is 40.85 kg/m².  Vital signs reviewed    Physical Exam  Vitals and nursing note reviewed.   Constitutional:       Appearance: Normal appearance. He is well-developed. He is obese. He is not ill-appearing.   Neck:      Thyroid: No thyromegaly.   Pulmonary:      Effort: Pulmonary effort is normal. No respiratory distress.   Musculoskeletal:         General: Normal range of motion.      Cervical back: Normal range of motion.   Neurological:      General: No focal deficit present.      Mental Status: He is alert. Mental status is at baseline.   Psychiatric:         Mood and Affect: Mood normal.         Behavior: Behavior normal.     Lab Reviewed:  See results in subjective  Lab Results   Component Value Date    HGBA1C 5.5 01/08/2025     Lab Results   Component Value  Date    CHOL 85 (L) 01/08/2025    HDL 33 (L) 01/08/2025    LDLCALC 36.2 (L) 01/08/2025    TRIG 79 01/08/2025    CHOLHDL 38.8 01/08/2025     Lab Results   Component Value Date     01/08/2025    K 4.0 01/08/2025     01/08/2025    CO2 25 01/08/2025    GLU 90 01/08/2025    BUN 10 01/08/2025    CREATININE 1.3 01/08/2025    CALCIUM 9.1 01/08/2025    PROT 7.0 01/08/2025    ALBUMIN 3.8 01/08/2025    BILITOT 0.2 01/08/2025    ALKPHOS 60 01/08/2025    AST 15 01/08/2025    ALT 21 01/08/2025    ANIONGAP 7 (L) 01/08/2025    EGFRNORACEVR >60.0 01/08/2025    TSH 6.050 (H) 01/08/2025    EHUYRLSI50XF 52 01/08/2025     Assessment     1. Postablative hypothyroidism  TSH    T4, Free    EUTHYROX 150 mcg tablet      2. Vitamin D deficiency        3. Schizoaffective disorder, bipolar type        4. IFG (impaired fasting glucose)        5. Class 2 severe obesity due to excess calories with serious comorbidity and body mass index (BMI) of 38.0 to 38.9 in adult          Plan     Postablative hypothyroidism  - Pt has a known h/o hypothyroidism diagnosed due to  post-surgical in setting of Graves disease as reported  - Patient's medications being managed by his mother due to longstanding psychiatric illness  - I reviewed lab work trends: TSH, Free T4 with patient in clinic  today 1/8/2025  - Discussed correct administration of thyroid medication with the patient: advised to take it on an empty stomach with water and wait 30-45 minutes before eating or taking other medications. Patient demonstrated understanding.  - TSH T4 checked today: TSH 6.0, normal free T4  - Increase Euthyrox name brand 150 mcg daily  - ONCE TSH is stable/normal, continue management and refills by patient's PCP: Ivonne Encarnacion,   - Follow-up scheduled for 6 months, as needed    Schizoaffective disorder  - On multiple medication at this time, mood is stable  - some can affect weight gain therefore affect thyroid function    IFG (impaired fasting  glucose)  - follow-up and management PCP      Class 2 severe obesity due to excess calories with serious comorbidity and body mass index (BMI) of 38.0 to 38.9 in adult  - Body mass index is 40.85 kg/m².  - with prediabetes, managed by PCP    Vitamin D deficiency  - Vitamin-D goal >30  - currently at goal on lab work done 2025  - check yearly      RTC in 6 months    Visit today included increased complexity associated with the care of the episodic problem addressed and managing the longitudinal care of the patient due to the serious and/or complex managed problem(s).   Including:  Hypothyroidism, obesity, vitamin-D deficiency, has been following with me since 1/2022    Pranav Barr M.D.  Endocrinology  [facility]  1/8/2025      Disclaimer: This note has been generated in part with the use of voice-recognition software. There may be typographical errors that have been missed during proof-reading.

## 2025-01-09 ENCOUNTER — OFFICE VISIT (OUTPATIENT)
Dept: FAMILY MEDICINE | Facility: CLINIC | Age: 25
End: 2025-01-09
Payer: COMMERCIAL

## 2025-01-09 VITALS
DIASTOLIC BLOOD PRESSURE: 68 MMHG | TEMPERATURE: 99 F | WEIGHT: 237.13 LBS | HEART RATE: 113 BPM | BODY MASS INDEX: 40.48 KG/M2 | HEIGHT: 64 IN | OXYGEN SATURATION: 96 % | SYSTOLIC BLOOD PRESSURE: 122 MMHG

## 2025-01-09 DIAGNOSIS — E66.01 CLASS 3 SEVERE OBESITY DUE TO EXCESS CALORIES WITH SERIOUS COMORBIDITY AND BODY MASS INDEX (BMI) OF 40.0 TO 44.9 IN ADULT: ICD-10-CM

## 2025-01-09 DIAGNOSIS — Z87.898 HISTORY OF PREDIABETES: ICD-10-CM

## 2025-01-09 DIAGNOSIS — E66.813 CLASS 3 SEVERE OBESITY DUE TO EXCESS CALORIES WITH SERIOUS COMORBIDITY AND BODY MASS INDEX (BMI) OF 40.0 TO 44.9 IN ADULT: ICD-10-CM

## 2025-01-09 DIAGNOSIS — D64.9 ANEMIA, UNSPECIFIED TYPE: ICD-10-CM

## 2025-01-09 DIAGNOSIS — Z86.39 HISTORY OF GRAVES' DISEASE: ICD-10-CM

## 2025-01-09 DIAGNOSIS — E89.0 POSTABLATIVE HYPOTHYROIDISM: ICD-10-CM

## 2025-01-09 DIAGNOSIS — J03.90 ACUTE TONSILLITIS, UNSPECIFIED ETIOLOGY: Primary | ICD-10-CM

## 2025-01-09 DIAGNOSIS — E78.5 HYPERLIPIDEMIA, UNSPECIFIED HYPERLIPIDEMIA TYPE: ICD-10-CM

## 2025-01-09 LAB
CTP QC/QA: YES
MOLECULAR STREP A: NEGATIVE

## 2025-01-09 PROCEDURE — 99999 PR PBB SHADOW E&M-EST. PATIENT-LVL IV: CPT | Mod: PBBFAC,,, | Performed by: FAMILY MEDICINE

## 2025-01-09 PROCEDURE — 87651 STREP A DNA AMP PROBE: CPT | Mod: QW,S$GLB,, | Performed by: FAMILY MEDICINE

## 2025-01-09 PROCEDURE — 3044F HG A1C LEVEL LT 7.0%: CPT | Mod: CPTII,S$GLB,, | Performed by: FAMILY MEDICINE

## 2025-01-09 PROCEDURE — 1159F MED LIST DOCD IN RCRD: CPT | Mod: CPTII,S$GLB,, | Performed by: FAMILY MEDICINE

## 2025-01-09 PROCEDURE — 1160F RVW MEDS BY RX/DR IN RCRD: CPT | Mod: CPTII,S$GLB,, | Performed by: FAMILY MEDICINE

## 2025-01-09 PROCEDURE — G2211 COMPLEX E/M VISIT ADD ON: HCPCS | Mod: S$GLB,,, | Performed by: FAMILY MEDICINE

## 2025-01-09 PROCEDURE — 3078F DIAST BP <80 MM HG: CPT | Mod: CPTII,S$GLB,, | Performed by: FAMILY MEDICINE

## 2025-01-09 PROCEDURE — 3008F BODY MASS INDEX DOCD: CPT | Mod: CPTII,S$GLB,, | Performed by: FAMILY MEDICINE

## 2025-01-09 PROCEDURE — 3074F SYST BP LT 130 MM HG: CPT | Mod: CPTII,S$GLB,, | Performed by: FAMILY MEDICINE

## 2025-01-09 PROCEDURE — 99214 OFFICE O/P EST MOD 30 MIN: CPT | Mod: S$GLB,,, | Performed by: FAMILY MEDICINE

## 2025-01-09 PROCEDURE — 87070 CULTURE OTHR SPECIMN AEROBIC: CPT | Performed by: FAMILY MEDICINE

## 2025-01-11 LAB — BACTERIA THROAT CULT: NORMAL

## 2025-01-12 ENCOUNTER — PATIENT MESSAGE (OUTPATIENT)
Dept: FAMILY MEDICINE | Facility: CLINIC | Age: 25
End: 2025-01-12
Payer: COMMERCIAL

## 2025-01-13 DIAGNOSIS — I10 PRIMARY HYPERTENSION: ICD-10-CM

## 2025-01-13 DIAGNOSIS — F41.1 GAD (GENERALIZED ANXIETY DISORDER): ICD-10-CM

## 2025-01-13 RX ORDER — PROPRANOLOL HYDROCHLORIDE 10 MG/1
20 TABLET ORAL 2 TIMES DAILY
Qty: 360 TABLET | Refills: 3 | Status: SHIPPED | OUTPATIENT
Start: 2025-01-13

## 2025-01-13 NOTE — TELEPHONE ENCOUNTER
No care due was identified.  [facility] Embedded Care Due Messages. Reference number: [id number].   1/13/2025 2:56:24 PM CST

## 2025-01-14 NOTE — TELEPHONE ENCOUNTER
Refill Decision Note   Francisca Rm  is requesting a refill authorization.  Brief Assessment and Rationale for Refill:  Approve     Medication Therapy Plan:         Comments:     Note composed:7:27 PM 01/13/2025

## 2025-02-13 ENCOUNTER — PATIENT MESSAGE (OUTPATIENT)
Dept: FAMILY MEDICINE | Facility: CLINIC | Age: 25
End: 2025-02-13
Payer: COMMERCIAL

## 2025-04-25 ENCOUNTER — OFFICE VISIT (OUTPATIENT)
Dept: FAMILY MEDICINE | Facility: CLINIC | Age: 25
End: 2025-04-25
Payer: COMMERCIAL

## 2025-04-25 DIAGNOSIS — Z02.89 ENCOUNTER FOR COMPLETION OF FORM WITH PATIENT: Primary | ICD-10-CM

## 2025-04-25 NOTE — PROGRESS NOTES
Assessment & Plan:    Encounter for completion of form with patient        The patient location is:  Patient Home   The chief complaint leading to consultation is: noted below  Visit type: Virtual visit with synchronous audio and video  Total time spent with patient: 10 minutes  Each patient to whom he or she provides medical services by telemedicine is:  (1) informed of the relationship between the physician and patient and the respective role of any other health care provider with respect to management of the patient; and (2) notified that he or she may decline to receive medical services by telemedicine and may withdraw from such care at any time.    Follow-up: Follow up if symptoms worsen or fail to improve.    ______________________________________________________________________    Chief Complaint  Chief Complaint   Patient presents with    FMLA Form       HPI  Francisca Rm is a 24 y.o. male with multiple medical diagnoses as listed in the medical history and problem list that presents in a virtual visit with his mother for completion of FMLA paperwork. Mother works for [employer] and has been mandated to return to the office, so she is requesting intermittent FMLA leave for involvement in the care of her son.       PAST MEDICAL HISTORY:  Past Medical History:   Diagnosis Date    Autism spectrum disorder     Bipolar disorder     Hyperlipidemia     Postablative hypothyroidism     Schizophrenia        PAST SURGICAL HISTORY:  History reviewed. No pertinent surgical history.    SOCIAL HISTORY:  Social History[1]    FAMILY HISTORY:  Family History   Problem Relation Name Age of Onset    No Known Problems Mother      No Known Problems Father      No Known Problems Sister      No Known Problems Brother      No Known Problems Maternal Aunt      No Known Problems Maternal Uncle      No Known Problems Paternal Aunt      No Known Problems Paternal Uncle      No Known Problems Maternal Grandmother      No  Known Problems Maternal Grandfather      No Known Problems Paternal Grandmother      No Known Problems Paternal Grandfather      No Known Problems Other         ALLERGIES AND MEDICATIONS: updated and reviewed.  Review of patient's allergies indicates:  No Known Allergies  Current Medications[2]      ROS  Review of Systems   Reason unable to perform ROS: encounter for completion of form only.       Health Maintenance         Date Due Completion Date    Influenza Vaccine (1) 09/01/2024 11/27/2023    COVID-19 Vaccine (5 - 2024-25 season) 09/01/2024 10/19/2022    Hemoglobin A1c (Prediabetes) 01/08/2026 1/8/2025    TETANUS VACCINE 12/07/2031 12/7/2021    RSV Vaccine (Age 60+ and Pregnant patients) (1 - 1-dose 75+ series) 10/10/2075 ---                     [1]   Social History  Socioeconomic History    Marital status: Single   Tobacco Use    Smoking status: Every Day    Smokeless tobacco: Never    Tobacco comments:     Smokes 3 - 4 cigars almost daily   Substance and Sexual Activity    Alcohol use: Not Currently    Drug use: Not Currently    Sexual activity: Not Currently     Social Drivers of Health     Financial Resource Strain: Low Risk  (4/25/2025)    Overall Financial Resource Strain (CARDIA)     Difficulty of Paying Living Expenses: Not hard at all   Food Insecurity: No Food Insecurity (4/25/2025)    Hunger Vital Sign     Worried About Running Out of Food in the Last Year: Never true     Ran Out of Food in the Last Year: Never true   Transportation Needs: No Transportation Needs (4/25/2025)    PRAPARE - Transportation     Lack of Transportation (Medical): No     Lack of Transportation (Non-Medical): No   Physical Activity: Insufficiently Active (4/25/2025)    Exercise Vital Sign     Days of Exercise per Week: 2 days     Minutes of Exercise per Session: 30 min   Stress: No Stress Concern Present (4/25/2025)    [employer] - Occupational Stress Questionnaire     Feeling of Stress : Not at  all   Housing Stability: Low Risk  (4/25/2025)    Housing Stability Vital Sign     Unable to Pay for Housing in the Last Year: No     Number of Times Moved in the Last Year: 0     Homeless in the Last Year: No   [2]   Current Outpatient Medications   Medication Sig Dispense Refill    blood sugar diagnostic Strp To check BG daily, to use with insurance preferred meter 200 each 11    blood-glucose meter kit To check BG daily, to use with insurance preferred meter 1 each 0    cetirizine (ZYRTEC) 10 MG tablet Take 10 mg by mouth.      cloZAPine (CLOZARIL) 100 MG Tab Take 3 tablets by mouth every morning and 3 tablets every evening.      divalproex (DEPAKOTE) 500 MG TbEC Take by mouth.      EScitalopram oxalate (LEXAPRO) 10 MG tablet Take 15 mg by mouth once daily.      EUTHYROX 150 mcg tablet Take 1 tablet (150 mcg total) by mouth before breakfast. 90 tablet 3    lancets Misc To check BG daily, to use with insurance preferred meter 200 each 11    metFORMIN (GLUCOPHAGE-XR) 500 MG ER 24hr tablet Take 1 tablet (500 mg total) by mouth 2 (two) times daily with meals. 180 tablet 3    pravastatin (PRAVACHOL) 20 MG tablet Take 1 tablet (20 mg total) by mouth once daily. 90 tablet 3    propranoloL (INDERAL) 10 MG tablet TAKE 2 TABLETS BY MOUTH TWICE DAILY 360 tablet 3    risperiDONE microspheres (RISPERDAL CONSTA) 37.5 mg/2 mL injection Inject 37.5 mg into the muscle every 14 (fourteen) days.      tirzepatide (MOUNJARO) 5 mg/0.5 mL PnIj Inject 5 mg into the skin every 7 days. 2 mL 11     No current facility-administered medications for this visit.

## 2025-05-07 ENCOUNTER — TELEPHONE (OUTPATIENT)
Dept: FAMILY MEDICINE | Facility: CLINIC | Age: 25
End: 2025-05-07
Payer: COMMERCIAL

## 2025-06-19 ENCOUNTER — PATIENT OUTREACH (OUTPATIENT)
Dept: ADMINISTRATIVE | Facility: HOSPITAL | Age: 25
End: 2025-06-19
Payer: COMMERCIAL

## 2025-06-19 DIAGNOSIS — R73.03 PREDIABETES: Primary | ICD-10-CM

## 2025-06-27 ENCOUNTER — RESULTS FOLLOW-UP (OUTPATIENT)
Dept: FAMILY MEDICINE | Facility: CLINIC | Age: 25
End: 2025-06-27

## 2025-06-27 ENCOUNTER — LAB VISIT (OUTPATIENT)
Dept: LAB | Facility: HOSPITAL | Age: 25
End: 2025-06-27
Attending: FAMILY MEDICINE
Payer: COMMERCIAL

## 2025-06-27 DIAGNOSIS — E89.0 POSTABLATIVE HYPOTHYROIDISM: ICD-10-CM

## 2025-06-27 DIAGNOSIS — E78.5 HYPERLIPIDEMIA, UNSPECIFIED HYPERLIPIDEMIA TYPE: ICD-10-CM

## 2025-06-27 DIAGNOSIS — Z87.898 HISTORY OF PREDIABETES: ICD-10-CM

## 2025-06-27 DIAGNOSIS — D64.9 ANEMIA, UNSPECIFIED TYPE: ICD-10-CM

## 2025-06-27 LAB
ABSOLUTE EOSINOPHIL (OHS): 0.2 K/UL
ABSOLUTE MONOCYTE (OHS): 0.77 K/UL (ref 0.3–1)
ABSOLUTE NEUTROPHIL COUNT (OHS): 2.8 K/UL (ref 1.8–7.7)
ALBUMIN SERPL BCP-MCNC: 4 G/DL (ref 3.5–5.2)
ALP SERPL-CCNC: 57 UNIT/L (ref 40–150)
ALT SERPL W/O P-5'-P-CCNC: 14 UNIT/L (ref 10–44)
ANION GAP (OHS): 9 MMOL/L (ref 8–16)
AST SERPL-CCNC: 12 UNIT/L (ref 11–45)
BASOPHILS # BLD AUTO: 0.04 K/UL
BASOPHILS NFR BLD AUTO: 0.6 %
BILIRUB SERPL-MCNC: 0.3 MG/DL (ref 0.1–1)
BUN SERPL-MCNC: 11 MG/DL (ref 6–20)
CALCIUM SERPL-MCNC: 9.3 MG/DL (ref 8.7–10.5)
CHLORIDE SERPL-SCNC: 110 MMOL/L (ref 95–110)
CHOLEST SERPL-MCNC: 113 MG/DL (ref 120–199)
CHOLEST/HDLC SERPL: 2.7 {RATIO} (ref 2–5)
CO2 SERPL-SCNC: 23 MMOL/L (ref 23–29)
CREAT SERPL-MCNC: 1.2 MG/DL (ref 0.5–1.4)
EAG (OHS): 120 MG/DL (ref 68–131)
ERYTHROCYTE [DISTWIDTH] IN BLOOD BY AUTOMATED COUNT: 16.9 % (ref 11.5–14.5)
GFR SERPLBLD CREATININE-BSD FMLA CKD-EPI: >60 ML/MIN/1.73/M2
GLUCOSE SERPL-MCNC: 88 MG/DL (ref 70–110)
HBA1C MFR BLD: 5.8 % (ref 4–5.6)
HCT VFR BLD AUTO: 41.4 % (ref 40–54)
HDLC SERPL-MCNC: 42 MG/DL (ref 40–75)
HDLC SERPL: 37.2 % (ref 20–50)
HGB BLD-MCNC: 14.1 GM/DL (ref 14–18)
IMM GRANULOCYTES # BLD AUTO: 0.04 K/UL (ref 0–0.04)
IMM GRANULOCYTES NFR BLD AUTO: 0.6 % (ref 0–0.5)
LDLC SERPL CALC-MCNC: 56.2 MG/DL (ref 63–159)
LYMPHOCYTES # BLD AUTO: 2.68 K/UL (ref 1–4.8)
MCH RBC QN AUTO: 30.5 PG (ref 27–31)
MCHC RBC AUTO-ENTMCNC: 34.1 G/DL (ref 32–36)
MCV RBC AUTO: 89 FL (ref 82–98)
NONHDLC SERPL-MCNC: 71 MG/DL
NUCLEATED RBC (/100WBC) (OHS): 0 /100 WBC
PLATELET # BLD AUTO: 166 K/UL (ref 150–450)
PMV BLD AUTO: 9.4 FL (ref 9.2–12.9)
POTASSIUM SERPL-SCNC: 4.5 MMOL/L (ref 3.5–5.1)
PROT SERPL-MCNC: 7.5 GM/DL (ref 6–8.4)
RBC # BLD AUTO: 4.63 M/UL (ref 4.6–6.2)
RELATIVE EOSINOPHIL (OHS): 3.1 %
RELATIVE LYMPHOCYTE (OHS): 41 % (ref 18–48)
RELATIVE MONOCYTE (OHS): 11.8 % (ref 4–15)
RELATIVE NEUTROPHIL (OHS): 42.9 % (ref 38–73)
SODIUM SERPL-SCNC: 142 MMOL/L (ref 136–145)
T4 FREE SERPL-MCNC: 0.98 NG/DL (ref 0.71–1.51)
T4 FREE SERPL-MCNC: 0.98 NG/DL (ref 0.71–1.51)
TRIGL SERPL-MCNC: 74 MG/DL (ref 30–150)
TSH SERPL-ACNC: 2.41 UIU/ML (ref 0.4–4)
WBC # BLD AUTO: 6.53 K/UL (ref 3.9–12.7)

## 2025-06-27 PROCEDURE — 84443 ASSAY THYROID STIM HORMONE: CPT

## 2025-06-27 PROCEDURE — 83036 HEMOGLOBIN GLYCOSYLATED A1C: CPT

## 2025-06-27 PROCEDURE — 80061 LIPID PANEL: CPT

## 2025-06-27 PROCEDURE — 36415 COLL VENOUS BLD VENIPUNCTURE: CPT

## 2025-06-27 PROCEDURE — 85025 COMPLETE CBC W/AUTO DIFF WBC: CPT

## 2025-06-27 PROCEDURE — 82040 ASSAY OF SERUM ALBUMIN: CPT

## 2025-07-10 ENCOUNTER — PATIENT MESSAGE (OUTPATIENT)
Dept: FAMILY MEDICINE | Facility: CLINIC | Age: 25
End: 2025-07-10

## 2025-07-10 DIAGNOSIS — E66.01 CLASS 3 SEVERE OBESITY DUE TO EXCESS CALORIES WITH SERIOUS COMORBIDITY AND BODY MASS INDEX (BMI) OF 40.0 TO 44.9 IN ADULT: ICD-10-CM

## 2025-07-10 DIAGNOSIS — E66.813 CLASS 3 SEVERE OBESITY DUE TO EXCESS CALORIES WITH SERIOUS COMORBIDITY AND BODY MASS INDEX (BMI) OF 40.0 TO 44.9 IN ADULT: ICD-10-CM

## 2025-07-10 DIAGNOSIS — G47.33 OSA (OBSTRUCTIVE SLEEP APNEA): Primary | ICD-10-CM

## 2025-07-11 ENCOUNTER — OFFICE VISIT (OUTPATIENT)
Dept: ENDOCRINOLOGY | Facility: CLINIC | Age: 25
End: 2025-07-11
Payer: MEDICARE

## 2025-07-11 VITALS
TEMPERATURE: 97 F | WEIGHT: 245.81 LBS | HEART RATE: 116 BPM | SYSTOLIC BLOOD PRESSURE: 127 MMHG | DIASTOLIC BLOOD PRESSURE: 75 MMHG | BODY MASS INDEX: 42.19 KG/M2

## 2025-07-11 DIAGNOSIS — E89.0 POSTABLATIVE HYPOTHYROIDISM: Primary | ICD-10-CM

## 2025-07-11 DIAGNOSIS — E55.9 VITAMIN D DEFICIENCY: ICD-10-CM

## 2025-07-11 DIAGNOSIS — G47.33 OSA (OBSTRUCTIVE SLEEP APNEA): ICD-10-CM

## 2025-07-11 DIAGNOSIS — E66.01 CLASS 2 SEVERE OBESITY DUE TO EXCESS CALORIES WITH SERIOUS COMORBIDITY AND BODY MASS INDEX (BMI) OF 38.0 TO 38.9 IN ADULT: ICD-10-CM

## 2025-07-11 DIAGNOSIS — E66.812 CLASS 2 SEVERE OBESITY DUE TO EXCESS CALORIES WITH SERIOUS COMORBIDITY AND BODY MASS INDEX (BMI) OF 38.0 TO 38.9 IN ADULT: ICD-10-CM

## 2025-07-11 PROCEDURE — 99999 PR PBB SHADOW E&M-EST. PATIENT-LVL III: CPT | Mod: PBBFAC,,, | Performed by: HOSPITALIST

## 2025-07-11 RX ORDER — TIRZEPATIDE 10 MG/.5ML
10 INJECTION, SOLUTION SUBCUTANEOUS
Qty: 2 ML | Refills: 0 | Status: SHIPPED | OUTPATIENT
Start: 2025-08-10

## 2025-07-11 RX ORDER — LEVOTHYROXINE SODIUM 150 UG/1
150 TABLET ORAL
Qty: 90 TABLET | Refills: 3 | Status: SHIPPED | OUTPATIENT
Start: 2025-07-11 | End: 2026-07-11

## 2025-07-11 RX ORDER — TIRZEPATIDE 7.5 MG/.5ML
7.5 INJECTION, SOLUTION SUBCUTANEOUS
Qty: 2 ML | Refills: 0 | Status: SHIPPED | OUTPATIENT
Start: 2025-07-11

## 2025-07-11 NOTE — PROGRESS NOTES
Subjective:      Patient ID: Francisca Rm is a 24 y.o. male presented to [facility] on 7/11/2025.  Chief Complaint:  thyroid    History of Present Illness: Francisca Rm is a 24 y.o. male here for  postablative hypothyroidism due to Graves disease  Other significant past medical history:  Schizoaffective disorder, obesity, tobacco user  Mother provided main history      Interval history:  Here for follow-up Postablative hypothyroidism, Cousin is here additional history.  Mom not present  Cousin does make sure patient takes his medication regularly.  Will is doing well.  No reports of issue with info given by cousin> less anxiety less agitation  Currently on Euthyrox 150 mcg daily   Obesity: Current in clinic weight: 245 , previous weight 238 248< 242 lb most recent office visit 12/2020, previous weight 221 (2/2023)  On Mounjaro managed by PCP, weight loss noted and treatment Prediabetes  Psych medication: Unchanged, still on Risperdal injection, Clozaril      1) Postablative hypothyroidism:   - 2017 had Graves disease>> Had VASQUEZ ablation in 2017  - Treated at [facility] here in town  - Had psych issue at that time which masked his hyperthyroidism symptoms  - Family history thyroid disorder: maternal aunt  - Thyroid goiter: no  - Previous US/FNA: no  - Current medication:  Name brand Euthyrox 150 mcg  daily    >> mom gives him his medication> does not miss doses>>Take 30 min before any other medications     US thyroid   9/2017  THYROID ULTRASOUND: The thyroid gland appears mildly enlarged with right thyroid lobe. measured at 5.4 x 1.3 x 2.2 cm with thyroid volume estimated at 7.3 mL's. The left thyroid lobe is 4.9 x 1.1 x 1.8 cm with left thyroid volume estimated at 4.7 mL's. There is mild diffuse heterogeneity without obvious nodule or cyst. There is no significant increased blood flow or additional abnormality. There is no significant adjacent adenopathy.      IMPRESSION: MILD DIFFUSE HETEROGENEITY AND ENLARGEMENT WITHOUT FOCAL DISEASE    Due to cognition, he does not report symptoms  Mom with noticed that patient would get more agitated or increase in diaphoresis on occasion>> if there are thyroid issue  No increases in weight recently.  No changes in mental status or behavior    Lab Results   Component Value Date    TSH 2.412 06/27/2025    TSH 6.050 (H) 01/08/2025    TSH 3.359 07/09/2024    FREET4 0.98 06/27/2025    FREET4 0.98 06/27/2025    FREET4 0.87 01/08/2025    THYROIDSTIMI <0.10 02/15/2023         2) Vit D Def  - Vit D3 2000iu 5 days a week  - vitamin-D level at goal 01/2025    Lab Results   Component Value Date    YQESWNQG75HT 52 01/08/2025    NFJASCOS13JQ 25 (L) 07/09/2024    VZARJEBH16DG 24.9 (L) 01/24/2024    CALCIUM 9.3 06/27/2025    CALCIUM 9.1 01/08/2025    CALCIUM 9.8 01/24/2024    ALKPHOS 57 06/27/2025    ALKPHOS 60 01/08/2025    TSH 2.412 06/27/2025      3) Prediabetes  - per patient's mother on metformin daily   - started by PCP  - Mounjaro 7.5 weight    Lab Results   Component Value Date    HGBA1C 5.8 (H) 06/27/2025     Reviewed past surgical, medical, family, social history and updated as appropriate.  Review of Systems: see HPI above    Objective:   /75 (BP Location: Right arm, Patient Position: Sitting)   Pulse (!) 116   Temp 97 °F (36.1 °C)   Wt 111.5 kg (245 lb 12.8 oz)   BMI 42.19 kg/m²     Body mass index is 42.19 kg/m².  Vital signs reviewed    Physical Exam  Vitals and nursing note reviewed.   Constitutional:       Appearance: Normal appearance. He is well-developed. He is obese. He is not ill-appearing.   Neck:      Thyroid: No thyromegaly.      Comments: Small thyroid gland bilateral  Pulmonary:      Effort: Pulmonary effort is normal. No respiratory distress.   Musculoskeletal:         General: Normal range of motion.      Cervical back: Normal range of motion.   Neurological:      General: No focal deficit present.       Mental Status: He is alert. Mental status is at baseline.   Psychiatric:         Mood and Affect: Mood normal.         Behavior: Behavior normal.     Lab Reviewed:  See results in subjective  Lab Results   Component Value Date    HGBA1C 5.8 (H) 06/27/2025     Lab Results   Component Value Date    CHOL 113 (L) 06/27/2025    HDL 42 06/27/2025    LDLCALC 56.2 (L) 06/27/2025    TRIG 74 06/27/2025    CHOLHDL 37.2 06/27/2025     Lab Results   Component Value Date     06/27/2025    K 4.5 06/27/2025     06/27/2025    CO2 23 06/27/2025    GLU 88 06/27/2025    BUN 11 06/27/2025    CREATININE 1.2 06/27/2025    CALCIUM 9.3 06/27/2025    PROT 7.5 06/27/2025    ALBUMIN 4.0 06/27/2025    BILITOT 0.3 06/27/2025    ALKPHOS 57 06/27/2025    AST 12 06/27/2025    ALT 14 06/27/2025    ANIONGAP 9 06/27/2025    EGFRNORACEVR >60 06/27/2025    TSH 2.412 06/27/2025    [id number] 52 01/08/2025     Assessment     1. Postablative hypothyroidism  EUTHYROX 150 mcg tablet    TSH    T4, Free      2. Class 2 severe obesity due to excess calories with serious comorbidity and body mass index (BMI) of 38.0 to 38.9 in adult        3. Vitamin D deficiency        4. DAVID (obstructive sleep apnea)            Plan     Postablative hypothyroidism  - Pt has a known h/o hypothyroidism diagnosed due to  post-surgical in setting of Graves disease as reported  - Patient's medications being managed by his mother due to longstanding psychiatric illness  - I reviewed lab work trends: TSH, Free T4 with patient in clinic  today 7/11/2025  - Discussed correct administration of thyroid medication with the patient: advised to take it on an empty stomach with water and wait 30-45 minutes before eating or taking other medications. Patient demonstrated understanding.  - CONTINUE Euthyrox name brand 150 mcg daily>> TSH at goal  - ONCE TSH is stable/normal, continue management and refills by patient's PCP: Ivonne Encarnacion DO  - Follow-up scheduled for 6  months, as needed    Class 2 severe obesity due to excess calories with serious comorbidity and body mass index (BMI) of 38.0 to 38.9 in adult  - Body mass index is 42.19 kg/m².  - weight gain noted, on Mounjaro  - message sent to PCP about increasing Mounjaro dose  - with prediabetes, managed by PCP    Vitamin D deficiency  - Vitamin-D goal >30  - currently at goal on lab work done 2025  - check yearly    DAVID (obstructive sleep apnea)  - does have DAVID, formal diagnosis needed for coverage [employer]    RTC in 6 months    Visit today included increased complexity associated with the care of the episodic problem addressed and managing the longitudinal care of the patient due to the serious and/or complex managed problem(s).   Including:  Hypothyroidism, obesity, vitamin-D deficiency, has been following with me since 1/2022    Pranav Barr M.D.  Endocrinology  [facility]  7/11/2025      Disclaimer: This note has been generated in part with the use of voice-recognition software. There may be typographical errors that have been missed during proof-reading

## 2025-07-11 NOTE — Clinical Note
Recommend increasing patient's Zepbound dose to 7.5 mg for 2 months then increase 10 mg given weight gain.  Does have DAVID, you can send it to [facility] the Zepbound under the diagnosis of DAVID

## 2025-07-11 NOTE — ASSESSMENT & PLAN NOTE
- Pt has a known h/o hypothyroidism diagnosed due to  post-surgical in setting of Graves disease as reported  - Patient's medications being managed by his mother due to longstanding psychiatric illness  - I reviewed lab work trends: TSH, Free T4 with patient in clinic  today 7/11/2025  - Discussed correct administration of thyroid medication with the patient: advised to take it on an empty stomach with water and wait 30-45 minutes before eating or taking other medications. Patient demonstrated understanding.  - CONTINUE Euthyrox name brand 150 mcg daily>> TSH at goal  - ONCE TSH is stable/normal, continue management and refills by patient's PCP: Ivonne Encarnacion, DO  - Follow-up scheduled for 6 months, as needed

## 2025-07-11 NOTE — ASSESSMENT & PLAN NOTE
- Body mass index is 42.19 kg/m².  - weight gain noted, on Mounjaro  - message sent to PCP about increasing Mounjaro dose  - with prediabetes, managed by PCP

## 2025-07-14 ENCOUNTER — TELEPHONE (OUTPATIENT)
Dept: FAMILY MEDICINE | Facility: CLINIC | Age: 25
End: 2025-07-14
Payer: MEDICARE

## 2025-07-17 ENCOUNTER — TELEPHONE (OUTPATIENT)
Dept: FAMILY MEDICINE | Facility: CLINIC | Age: 25
End: 2025-07-17

## 2025-07-17 ENCOUNTER — OFFICE VISIT (OUTPATIENT)
Dept: FAMILY MEDICINE | Facility: CLINIC | Age: 25
End: 2025-07-17
Payer: MEDICARE

## 2025-07-17 ENCOUNTER — PATIENT OUTREACH (OUTPATIENT)
Dept: ADMINISTRATIVE | Facility: HOSPITAL | Age: 25
End: 2025-07-17
Payer: MEDICARE

## 2025-07-17 VITALS
DIASTOLIC BLOOD PRESSURE: 78 MMHG | TEMPERATURE: 99 F | RESPIRATION RATE: 18 BRPM | BODY MASS INDEX: 42.19 KG/M2 | HEIGHT: 64 IN | SYSTOLIC BLOOD PRESSURE: 110 MMHG | OXYGEN SATURATION: 97 % | HEART RATE: 118 BPM

## 2025-07-17 DIAGNOSIS — R73.03 PRE-DIABETES: Primary | ICD-10-CM

## 2025-07-17 DIAGNOSIS — Z23 NEED FOR COVID-19 VACCINE: ICD-10-CM

## 2025-07-17 DIAGNOSIS — Z86.39 HISTORY OF GRAVES' DISEASE: ICD-10-CM

## 2025-07-17 DIAGNOSIS — I10 PRIMARY HYPERTENSION: ICD-10-CM

## 2025-07-17 DIAGNOSIS — E89.0 POSTABLATIVE HYPOTHYROIDISM: ICD-10-CM

## 2025-07-17 DIAGNOSIS — E66.813 CLASS 3 SEVERE OBESITY DUE TO EXCESS CALORIES WITH SERIOUS COMORBIDITY AND BODY MASS INDEX (BMI) OF 40.0 TO 44.9 IN ADULT: ICD-10-CM

## 2025-07-17 DIAGNOSIS — F84.0 AUTISM: ICD-10-CM

## 2025-07-17 DIAGNOSIS — F20.9 SCHIZOPHRENIA, UNSPECIFIED TYPE: ICD-10-CM

## 2025-07-17 DIAGNOSIS — E78.5 HYPERLIPIDEMIA, UNSPECIFIED HYPERLIPIDEMIA TYPE: ICD-10-CM

## 2025-07-17 PROCEDURE — 99214 OFFICE O/P EST MOD 30 MIN: CPT | Mod: S$GLB,,, | Performed by: FAMILY MEDICINE

## 2025-07-17 PROCEDURE — 91320 SARSCV2 VAC 30MCG TRS-SUC IM: CPT | Mod: S$GLB,,, | Performed by: FAMILY MEDICINE

## 2025-07-17 PROCEDURE — 1159F MED LIST DOCD IN RCRD: CPT | Mod: CPTII,S$GLB,, | Performed by: FAMILY MEDICINE

## 2025-07-17 PROCEDURE — 1160F RVW MEDS BY RX/DR IN RCRD: CPT | Mod: CPTII,S$GLB,, | Performed by: FAMILY MEDICINE

## 2025-07-17 PROCEDURE — 3074F SYST BP LT 130 MM HG: CPT | Mod: CPTII,S$GLB,, | Performed by: FAMILY MEDICINE

## 2025-07-17 PROCEDURE — 3008F BODY MASS INDEX DOCD: CPT | Mod: CPTII,S$GLB,, | Performed by: FAMILY MEDICINE

## 2025-07-17 PROCEDURE — 90480 ADMN SARSCOV2 VAC 1/ONLY CMP: CPT | Mod: S$GLB,,, | Performed by: FAMILY MEDICINE

## 2025-07-17 PROCEDURE — 99999 PR PBB SHADOW E&M-EST. PATIENT-LVL IV: CPT | Mod: PBBFAC,,, | Performed by: FAMILY MEDICINE

## 2025-07-17 PROCEDURE — 3078F DIAST BP <80 MM HG: CPT | Mod: CPTII,S$GLB,, | Performed by: FAMILY MEDICINE

## 2025-07-17 PROCEDURE — G2211 COMPLEX E/M VISIT ADD ON: HCPCS | Mod: S$GLB,,, | Performed by: FAMILY MEDICINE

## 2025-07-17 PROCEDURE — 3044F HG A1C LEVEL LT 7.0%: CPT | Mod: CPTII,S$GLB,, | Performed by: FAMILY MEDICINE

## 2025-07-17 RX ORDER — PRAVASTATIN SODIUM 20 MG/1
20 TABLET ORAL DAILY
Qty: 90 TABLET | Refills: 3 | Status: SHIPPED | OUTPATIENT
Start: 2025-07-17

## 2025-07-17 RX ORDER — METFORMIN HYDROCHLORIDE 500 MG/1
500 TABLET, EXTENDED RELEASE ORAL 2 TIMES DAILY WITH MEALS
Qty: 180 TABLET | Refills: 3 | Status: SHIPPED | OUTPATIENT
Start: 2025-07-17 | End: 2026-07-17

## 2025-07-17 NOTE — PROGRESS NOTES
Administered Covid  in left arm , patient tolerated well. VIS given , instructed to wait fifteen minutes.

## 2025-07-17 NOTE — TELEPHONE ENCOUNTER
Copied from CRM #[id number]. Topic: Appointments - Appointment Access  >> Jul 17, 2025 10:19 AM Summer wrote:  Type: Patient Call Back    Who called:pt    What is the request in detail:pt is running a little late, will make it by 10:30 please assist.    Can the clinic reply by [facility]? no    Would the patient rather a call back or a response via [facility]? call    Best call back number:[phone]    Additional Information:

## 2025-07-17 NOTE — PATIENT INSTRUCTIONS
COVID vaccine was given.    Your blood sugar is a little elevated. Please limit candy, fried foods, and fast foods.     Pravastatin and metformin were refilled.    Repeat labs before a follow up in 6 months.

## 2025-07-17 NOTE — PROGRESS NOTES
Assessment & Plan:    Pre-diabetes  -     metFORMIN (GLUCOPHAGE-XR) 500 MG ER 24hr tablet; Take 1 tablet (500 mg total) by mouth 2 (two) times daily with meals.  Dispense: 180 tablet; Refill: 3  -     Comprehensive Metabolic Panel; Future; Expected date: 07/17/2025  -     Hemoglobin A1C; Future; Expected date: 07/17/2025    Increase in the A1c to 5.8. Briefly discussed dietary changes necessary to lower the blood sugar. Metformin refilled.    Hyperlipidemia, unspecified hyperlipidemia type  -     pravastatin (PRAVACHOL) 20 MG tablet; Take 1 tablet (20 mg total) by mouth once daily.  Dispense: 90 tablet; Refill: 3  -     Lipid Panel; Future; Expected date: 07/17/2025    Controlled. Medication(s) refilled.     Class 3 severe obesity due to excess calories with serious comorbidity and body mass index (BMI) of 40.0 to 44.9 in adult  See above.    Primary hypertension  -     Comprehensive Metabolic Panel; Future; Expected date: 07/17/2025    Controlled. Continue current therapy.     History of Graves' disease  Postablative hypothyroidism  Controlled. Continue current therapy.     Autism  Schizophrenia, unspecified type    Need for COVID-19 vaccine  -     COVID-19 (Pfizer) 30 mcg/0.3 mL IM vaccine (>/= 13 yo) 0.3 mL    Repeat labs in 6 months.     Follow-up: Follow up in about 6 months (around 1/17/2026).  ______________________________________________________________________    Chief Complaint  Chief Complaint   Patient presents with    Health Maintenance       HPI  Francisca Rm is a 24 y.o. male with medical diagnoses as listed in the medical history and problem list that presents to the office to follow up on his chronic conditions. He was brought to his appointment 16 minutes late by a family member. Patient was unaccompanied in the room, mother could not be present at his visit today. Patient states that he is feeling good and does not report any new concerns.     Health Maintenance         Date Due  "Completion Date    COVID-19 Vaccine (5 - 2024-25 season) 09/01/2024 10/19/2022    Influenza Vaccine (1) 09/01/2025 11/27/2023    Hemoglobin A1c (Prediabetes) 06/27/2026 6/27/2025    TETANUS VACCINE 12/07/2031 12/7/2021    RSV Vaccine (Age 60+ and Pregnant patients) (1 - 1-dose 75+ series) 10/10/2075 ---              PAST MEDICAL HISTORY:  Past Medical History:   Diagnosis Date    Autism spectrum disorder     Bipolar disorder     Hyperlipidemia     Postablative hypothyroidism     Schizophrenia        PAST SURGICAL HISTORY:  History reviewed. No pertinent surgical history.    SOCIAL HISTORY:  Social History[1]    FAMILY HISTORY:  Family History   Problem Relation Name Age of Onset    No Known Problems Mother      No Known Problems Father      No Known Problems Sister      No Known Problems Brother      No Known Problems Maternal Aunt      No Known Problems Maternal Uncle      No Known Problems Paternal Aunt      No Known Problems Paternal Uncle      No Known Problems Maternal Grandmother      No Known Problems Maternal Grandfather      No Known Problems Paternal Grandmother      No Known Problems Paternal Grandfather      No Known Problems Other         ALLERGIES AND MEDICATIONS: updated and reviewed.  Review of patient's allergies indicates:  No Known Allergies  Current Medications[2]      ROS  Review of Systems   Constitutional:  Negative for activity change.           Physical Exam  Vitals:    07/17/25 1045   BP: 110/78   Patient Position: Sitting   Pulse: (!) 118   Resp: 18   Temp: 99 °F (37.2 °C)   TempSrc: Oral   SpO2: 97%   Height: 5' 4" (1.626 m)    Body mass index is 42.19 kg/m².      Height: 5' 4" (162.6 cm)   Physical Exam  Constitutional:       General: He is not in acute distress.     Appearance: He is obese.   HENT:      Head: Normocephalic and atraumatic.   Neck:      Thyroid: No thyromegaly.   Cardiovascular:      Rate and Rhythm: Normal rate and regular rhythm.      Pulses: Normal pulses.      Heart " sounds: Normal heart sounds.   Pulmonary:      Effort: Pulmonary effort is normal. No respiratory distress.      Breath sounds: Normal breath sounds.   Musculoskeletal:      Cervical back: Neck supple.      Right lower leg: No edema.      Left lower leg: No edema.   Lymphadenopathy:      Cervical: No cervical adenopathy.   Skin:     General: Skin is warm and dry.      Findings: No rash.   Neurological:      Mental Status: He is alert. Mental status is at baseline.   Psychiatric:         Mood and Affect: Mood normal.                  [1]   Social History  Socioeconomic History    Marital status: Single   Tobacco Use    Smoking status: Every Day    Smokeless tobacco: Never    Tobacco comments:     Smokes 3 - 4 cigars almost daily   Substance and Sexual Activity    Alcohol use: Not Currently    Drug use: Not Currently    Sexual activity: Not Currently     Social Drivers of Health     Financial Resource Strain: Low Risk  (4/25/2025)    Overall Financial Resource Strain (CARDIA)     Difficulty of Paying Living Expenses: Not hard at all   Food Insecurity: No Food Insecurity (4/25/2025)    Hunger Vital Sign     Worried About Running Out of Food in the Last Year: Never true     Ran Out of Food in the Last Year: Never true   Transportation Needs: No Transportation Needs (4/25/2025)    PRAPARE - Transportation     Lack of Transportation (Medical): No     Lack of Transportation (Non-Medical): No   Physical Activity: Insufficiently Active (4/25/2025)    Exercise Vital Sign     Days of Exercise per Week: 2 days     Minutes of Exercise per Session: 30 min   Stress: No Stress Concern Present (4/25/2025)    [employer] - Occupational Stress Questionnaire     Feeling of Stress : Not at all   Housing Stability: Low Risk  (4/25/2025)    Housing Stability Vital Sign     Unable to Pay for Housing in the Last Year: No     Number of Times Moved in the Last Year: 0     Homeless in the Last Year: No   [2]    Current Outpatient Medications   Medication Sig Dispense Refill    blood sugar diagnostic Strp To check BG daily, to use with insurance preferred meter 200 each 11    blood-glucose meter kit To check BG daily, to use with insurance preferred meter 1 each 0    cetirizine (ZYRTEC) 10 MG tablet Take 10 mg by mouth.      cloZAPine (CLOZARIL) 100 MG Tab Take 3 tablets by mouth every morning and 3 tablets every evening.      divalproex (DEPAKOTE) 500 MG TbEC Take by mouth.      EScitalopram oxalate (LEXAPRO) 10 MG tablet Take 15 mg by mouth once daily.      EUTHYROX 150 mcg tablet Take 1 tablet (150 mcg total) by mouth before breakfast. 90 tablet 3    lancets Misc To check BG daily, to use with insurance preferred meter 200 each 11    metFORMIN (GLUCOPHAGE-XR) 500 MG ER 24hr tablet Take 1 tablet (500 mg total) by mouth 2 (two) times daily with meals. 180 tablet 3    pravastatin (PRAVACHOL) 20 MG tablet Take 1 tablet (20 mg total) by mouth once daily. 90 tablet 3    propranoloL (INDERAL) 10 MG tablet TAKE 2 TABLETS BY MOUTH TWICE DAILY 360 tablet 3    risperiDONE microspheres (RISPERDAL CONSTA) 37.5 mg/2 mL injection Inject 37.5 mg into the muscle every 14 (fourteen) days.      tirzepatide (MOUNJARO) 5 mg/0.5 mL PnIj Inject 5 mg into the skin every 7 days. 2 mL 11    [START ON 8/10/2025] tirzepatide, weight loss, (ZEPBOUND) 10 mg/0.5 mL PnIj Inject 10 mg into the skin every 7 days. 2 mL 0    tirzepatide, weight loss, (ZEPBOUND) 7.5 mg/0.5 mL PnIj Inject 7.5 mg into the skin every 7 days. 2 mL 0     Current Facility-Administered Medications   Medication Dose Route Frequency Provider Last Rate Last Admin    COVID-19 (Pfizer) 30 mcg/0.3 mL IM vaccine (>/= 13 yo) 0.3 mL  0.3 mL Intramuscular 1 time in Clinic/HOD

## 2025-07-18 ENCOUNTER — PATIENT MESSAGE (OUTPATIENT)
Dept: FAMILY MEDICINE | Facility: CLINIC | Age: 25
End: 2025-07-18
Payer: MEDICARE

## 2025-07-18 DIAGNOSIS — E66.813 CLASS 3 SEVERE OBESITY DUE TO EXCESS CALORIES WITH SERIOUS COMORBIDITY AND BODY MASS INDEX (BMI) OF 40.0 TO 44.9 IN ADULT: Primary | ICD-10-CM

## 2025-07-24 RX ORDER — SEMAGLUTIDE 1 MG/.5ML
1 INJECTION, SOLUTION SUBCUTANEOUS
Qty: 2 ML | Refills: 11 | Status: SHIPPED | OUTPATIENT
Start: 2025-07-24

## 2025-07-29 ENCOUNTER — TELEPHONE (OUTPATIENT)
Dept: BARIATRICS | Facility: CLINIC | Age: 25
End: 2025-07-29
Payer: MEDICARE

## 2025-07-29 ENCOUNTER — PATIENT MESSAGE (OUTPATIENT)
Dept: FAMILY MEDICINE | Facility: CLINIC | Age: 25
End: 2025-07-29
Payer: MEDICARE

## 2025-07-29 DIAGNOSIS — E66.813 CLASS 3 SEVERE OBESITY DUE TO EXCESS CALORIES WITH SERIOUS COMORBIDITY AND BODY MASS INDEX (BMI) OF 40.0 TO 44.9 IN ADULT: Primary | ICD-10-CM

## 2025-08-05 ENCOUNTER — TELEPHONE (OUTPATIENT)
Dept: BARIATRICS | Facility: CLINIC | Age: 25
End: 2025-08-05
Payer: MEDICARE

## 2025-08-05 NOTE — TELEPHONE ENCOUNTER
----- Message from Tejas sent at 7/29/2025  4:16 PM CDT -----  Regarding: check guarantor note  check guarantor note

## 2025-08-13 ENCOUNTER — OFFICE VISIT (OUTPATIENT)
Dept: BARIATRICS | Facility: CLINIC | Age: 25
End: 2025-08-13
Payer: MEDICARE

## 2025-08-13 VITALS
BODY MASS INDEX: 40.84 KG/M2 | OXYGEN SATURATION: 96 % | HEIGHT: 65 IN | WEIGHT: 245.13 LBS | HEART RATE: 101 BPM | DIASTOLIC BLOOD PRESSURE: 85 MMHG | SYSTOLIC BLOOD PRESSURE: 126 MMHG

## 2025-08-13 DIAGNOSIS — F25.0 SCHIZOAFFECTIVE DISORDER, BIPOLAR TYPE: Chronic | ICD-10-CM

## 2025-08-13 DIAGNOSIS — R00.0 TACHYCARDIA: ICD-10-CM

## 2025-08-13 DIAGNOSIS — E66.813 CLASS 3 SEVERE OBESITY DUE TO EXCESS CALORIES WITH SERIOUS COMORBIDITY AND BODY MASS INDEX (BMI) OF 40.0 TO 44.9 IN ADULT: Primary | ICD-10-CM

## 2025-08-13 DIAGNOSIS — F84.0 AUTISM: ICD-10-CM

## 2025-08-13 DIAGNOSIS — G47.33 OSA (OBSTRUCTIVE SLEEP APNEA): ICD-10-CM

## 2025-08-13 PROCEDURE — 1159F MED LIST DOCD IN RCRD: CPT | Mod: CPTII,S$GLB,, | Performed by: INTERNAL MEDICINE

## 2025-08-13 PROCEDURE — 3074F SYST BP LT 130 MM HG: CPT | Mod: CPTII,S$GLB,, | Performed by: INTERNAL MEDICINE

## 2025-08-13 PROCEDURE — 99215 OFFICE O/P EST HI 40 MIN: CPT | Mod: S$GLB,,, | Performed by: INTERNAL MEDICINE

## 2025-08-13 PROCEDURE — 3044F HG A1C LEVEL LT 7.0%: CPT | Mod: CPTII,S$GLB,, | Performed by: INTERNAL MEDICINE

## 2025-08-13 PROCEDURE — 1160F RVW MEDS BY RX/DR IN RCRD: CPT | Mod: CPTII,S$GLB,, | Performed by: INTERNAL MEDICINE

## 2025-08-13 PROCEDURE — 99999 PR PBB SHADOW E&M-EST. PATIENT-LVL V: CPT | Mod: PBBFAC,,, | Performed by: INTERNAL MEDICINE

## 2025-08-13 PROCEDURE — 3008F BODY MASS INDEX DOCD: CPT | Mod: CPTII,S$GLB,, | Performed by: INTERNAL MEDICINE

## 2025-08-13 PROCEDURE — 3079F DIAST BP 80-89 MM HG: CPT | Mod: CPTII,S$GLB,, | Performed by: INTERNAL MEDICINE

## 2025-08-13 RX ORDER — SEMAGLUTIDE 0.25 MG/.5ML
0.25 INJECTION, SOLUTION SUBCUTANEOUS WEEKLY
Qty: 2 ML | Refills: 0 | Status: SHIPPED | OUTPATIENT
Start: 2025-08-13 | End: 2025-09-10

## 2025-08-13 RX ORDER — SEMAGLUTIDE 0.5 MG/.5ML
0.5 INJECTION, SOLUTION SUBCUTANEOUS WEEKLY
Qty: 2 ML | Refills: 0 | Status: SHIPPED | OUTPATIENT
Start: 2025-09-11 | End: 2025-10-09